# Patient Record
Sex: MALE | Race: WHITE | NOT HISPANIC OR LATINO | Employment: FULL TIME | URBAN - METROPOLITAN AREA
[De-identification: names, ages, dates, MRNs, and addresses within clinical notes are randomized per-mention and may not be internally consistent; named-entity substitution may affect disease eponyms.]

---

## 2021-07-01 ENCOUNTER — PATIENT MESSAGE (OUTPATIENT)
Dept: ADMINISTRATIVE | Facility: OTHER | Age: 26
End: 2021-07-01

## 2022-08-21 NOTE — PROGRESS NOTES
[facility] Note    Chief Complaint      Chief Complaint   Patient presents with    Annual Exam    Establish Care       History of Present Illness      Mannie Naqvi is a 26 y.o. M with chronic back pain, depression, Alopecia, ADD, Inflammatory Arthritis presents to est PCP and for well visit.    Chronic back pain - Since the age of 14-15 he has had significant back pain intermittently. He has seen multiple specialists including previous rheumatologist in [facility].  Sees chiropractor physical therapy anti-inflammatory muscle relaxants to try to help his pain. He reports he did different type of weight set along with stretching type process he has found physical therapy to be beneficial he did wonderful when he was prescribed a Medrol Dosepak with Dr. Andrew on August 29. Robaxin did not find to be beneficial.  He has had an MRI in the distant past he cannot recall the specifics of it he has not had any procedures or surgery involving. He takes naproxen 500mg po bid prn, flexeril 10mg po qhs prn. +HLA B27 - 8/26/14 and 10/11/18    Depression - Prev dx by psychiatrist he took an antidepressant but did not find it to be beneficial and discontinued. He reports he thinks his depression is situational related to his chronic pain that is been undiagnosed.    Alopecia - Pt on finasteride 5 mg/d and Minoxodil 2.5 mg/d    ADD - Adderall XR 20 mg QAm and 10 mg daily.  Fu by Psych.     Chronic back pain- Fu by Rheum.  + Chronic back pain. He takes Flexeril 5 mg po QHS oprn and naprosyn prn.     H/o COVID -19 - 7/18/20 - No lingering effects    HCM - Flu - ?'22;  Tdap - < 10 yrs?;  PCV 13 - none;  PVX 23 - none;  Shingrix - due at 48 y.o.;  COVID - 19 Vaccine #1 3/31/21;  #2 4/28/21; # 3 2/4/22;  Hep C Screen - none - declines;  HIV Screen - neg. In past; C-scope - none - due at 43 y.o.;  Prev PCP - none; Rheum - Dr. Stein at [facility]; Derm - Dr. Montemayor; Psych - Dr. Kirill Clemens; optometry - [facility];  - Dr. Kunz; Cysto - 5/23/14 - neg. 6/1/17  - Stress test - neg; echo - 5/20/17 - EF - 60-65%    Patient Care Team:  Jelly Nettles MD as PCP - General (Internal Medicine)     Health Maintenance:    There is no immunization history on file for this patient.   Health Maintenance   Topic Date Due    Hepatitis C Screening  Never done    Lipid Panel  Never done    TETANUS VACCINE  Never done        Past Medical History:  Past Medical History:   Diagnosis Date    ADD (attention deficit disorder) 08/24/2022    Alopecia 08/24/2022    Chronic mid back pain 08/24/2022    COVID-19 07/18/2020    Depression 08/24/2022       Past Surgical History:   has no past surgical history on file.    Family History:  family history includes Arthritis in his paternal grandfather; Chronic back pain in his father and sister; Depression in his sister; Heart attack in his cousin; Heart attack (age of onset: 84) in his maternal grandmother; Hyperlipidemia in his maternal grandfather and mother; Kidney disease in his maternal grandfather.     Social History:  Social History     Tobacco Use    Smoking status: Never Smoker    Smokeless tobacco: Never Used   Substance Use Topics    Alcohol use: Yes     Comment: 2/night    Drug use: Never       Review of Systems   Constitutional: Negative for chills, diaphoresis and fever.   HENT: Negative for hearing loss.    Eyes: Negative for visual disturbance.        Wears contacts   Respiratory: Positive for chest tightness and shortness of breath.         Only when gets anxiety/panic attack   Cardiovascular: Negative for chest pain and palpitations.   Gastrointestinal: Negative for abdominal pain, constipation, diarrhea, nausea and vomiting.   Genitourinary: Positive for dysuria. Negative for hematuria.        No hesitancy. + nocturia. He reports a neg. Cystoscopy in the past. +Polyuria.  He drinks 2-4 cups of coffee/day. He tries to limit fluids prior to bed.  dysuia once  "every 6-8 wks when dehydrated.    Musculoskeletal: Positive for back pain. Negative for arthralgias.   Neurological: Negative for dizziness and headaches.   Psychiatric/Behavioral: Positive for dysphoric mood. Negative for suicidal ideas. The patient is nervous/anxious.         Medications:    Current Outpatient Medications:     cyclobenzaprine (FLEXERIL) 5 MG tablet, Take 10 mg by mouth once daily at 6am., Disp: , Rfl:     dextroamphetamine-amphetamine 10 mg Tab, Take 1 tablet by mouth once daily at 6am., Disp: , Rfl:     finasteride (PROSCAR) 5 mg tablet, Take 1 tablet by mouth once daily at 6am., Disp: , Rfl:     minoxidiL (LONITEN) 2.5 MG tablet, Take 1 tablet by mouth once daily at 6am., Disp: , Rfl:     naproxen (NAPROSYN) 500 MG tablet, Take 1 tablet (500 mg total) by mouth 2 (two) times daily with meals., Disp: 60 tablet, Rfl: 6    dextroamphetamine-amphetamine (ADDERALL XR) 20 MG 24 hr capsule, Take 1 capsule (20 mg total) by mouth every morning., Disp: 1 capsule, Rfl: 0     Allergies:  Review of patient's allergies indicates:  No Known Allergies    Physical Exam      Vital Signs  Temp: 98 °F (36.7 °C)  Temp src: Oral  Pulse: 77  Resp: 16  SpO2: 98 %  BP: 136/84  BP Location: Right arm  Patient Position: Sitting  Pain Score: 0-No pain  Height and Weight  Height: 5' 6" (167.6 cm)  Weight: 77.1 kg (169 lb 15.6 oz)  BSA (Calculated - sq m): 1.89 sq meters  BMI (Calculated): 27.4  Weight in (lb) to have BMI = 25: 154.6      Patient Position: Sitting      Physical Exam  Vitals reviewed.   Constitutional:       General: He is not in acute distress.     Appearance: Normal appearance. He is not ill-appearing, toxic-appearing or diaphoretic.   HENT:      Head: Normocephalic and atraumatic.      Right Ear: Tympanic membrane normal.      Left Ear: Tympanic membrane normal.   Eyes:      Extraocular Movements: Extraocular movements intact.      Conjunctiva/sclera: Conjunctivae normal.      Pupils: Pupils are " equal, round, and reactive to light.   Cardiovascular:      Rate and Rhythm: Normal rate and regular rhythm.      Pulses: Normal pulses.      Heart sounds: Normal heart sounds. No murmur heard.  Pulmonary:      Effort: Pulmonary effort is normal. No respiratory distress.      Breath sounds: Normal breath sounds.   Abdominal:      General: Bowel sounds are normal. There is no distension.      Palpations: Abdomen is soft.      Tenderness: There is no abdominal tenderness. There is no guarding.   Musculoskeletal:         General: Normal range of motion.      Cervical back: Neck supple.   Skin:     General: Skin is warm.   Neurological:      General: No focal deficit present.      Mental Status: He is alert and oriented to person, place, and time.   Psychiatric:         Mood and Affect: Mood normal.         Behavior: Behavior normal.          Laboratory:      Assessment/Plan     Mannie Naqvi is a 26 y.o.male with:    Normal physical exam, routine  -     CBC Auto Differential; Future; Expected date: 08/24/2022  -     Comprehensive Metabolic Panel; Future; Expected date: 08/24/2022  -     T4, Free; Future; Expected date: 08/24/2022  -     TSH; Future; Expected date: 08/24/2022  -     Urinalysis, Reflex to Urine Culture Urine, Clean Catch  - Performed today.  Will check Basic labs.  RTC in 1 yr for fu or sooner if needed    Chronic mid back pain  - Stable.  Cont current regimen. Fu by rheum.     Alopecia  - Fu by Dr. Montemayor.     Depression, unspecified depression type  - Stable.  Cont current regimen. Managed by Psych.     Attention deficit disorder, unspecified hyperactivity presence  - Stable.  Cont current regimen. Managed by Psych.     Screening for lipoid disorders  -     Lipid Panel; Future; Expected date: 08/24/2022    Other orders  -     dextroamphetamine-amphetamine (ADDERALL XR) 20 MG 24 hr capsule; Take 1 capsule (20 mg total) by mouth every morning.  Dispense: 1 capsule; Refill: 0         Chronic conditions  status updated as per HPI.  Other than changes above, cont current medications and maintain follow up with specialists.  Follow up in about 1 year (around 8/24/2023) for well visit or sooner if needed.      Jelly Nettles MD  [facility]

## 2022-08-24 ENCOUNTER — OFFICE VISIT (OUTPATIENT)
Dept: PRIMARY CARE CLINIC | Facility: CLINIC | Age: 27
End: 2022-08-24
Payer: COMMERCIAL

## 2022-08-24 VITALS
OXYGEN SATURATION: 98 % | SYSTOLIC BLOOD PRESSURE: 136 MMHG | HEART RATE: 77 BPM | WEIGHT: 170 LBS | RESPIRATION RATE: 16 BRPM | HEIGHT: 66 IN | BODY MASS INDEX: 27.32 KG/M2 | TEMPERATURE: 98 F | DIASTOLIC BLOOD PRESSURE: 84 MMHG

## 2022-08-24 DIAGNOSIS — F32.A DEPRESSION, UNSPECIFIED DEPRESSION TYPE: ICD-10-CM

## 2022-08-24 DIAGNOSIS — F98.8 ATTENTION DEFICIT DISORDER, UNSPECIFIED HYPERACTIVITY PRESENCE: ICD-10-CM

## 2022-08-24 DIAGNOSIS — Z00.00 NORMAL PHYSICAL EXAM, ROUTINE: Primary | ICD-10-CM

## 2022-08-24 DIAGNOSIS — Z13.220 SCREENING FOR LIPOID DISORDERS: ICD-10-CM

## 2022-08-24 DIAGNOSIS — G89.29 CHRONIC MID BACK PAIN: ICD-10-CM

## 2022-08-24 DIAGNOSIS — M54.9 CHRONIC MID BACK PAIN: ICD-10-CM

## 2022-08-24 DIAGNOSIS — L65.9 ALOPECIA: ICD-10-CM

## 2022-08-24 PROCEDURE — 3075F SYST BP GE 130 - 139MM HG: CPT | Mod: CPTII,S$GLB,, | Performed by: INTERNAL MEDICINE

## 2022-08-24 PROCEDURE — 1159F MED LIST DOCD IN RCRD: CPT | Mod: CPTII,S$GLB,, | Performed by: INTERNAL MEDICINE

## 2022-08-24 PROCEDURE — 99999 PR PBB SHADOW E&M-EST. PATIENT-LVL V: ICD-10-PCS | Mod: PBBFAC,,, | Performed by: INTERNAL MEDICINE

## 2022-08-24 PROCEDURE — 1160F PR REVIEW ALL MEDS BY PRESCRIBER/CLIN PHARMACIST DOCUMENTED: ICD-10-PCS | Mod: CPTII,S$GLB,, | Performed by: INTERNAL MEDICINE

## 2022-08-24 PROCEDURE — 99385 PREV VISIT NEW AGE 18-39: CPT | Mod: S$GLB,,, | Performed by: INTERNAL MEDICINE

## 2022-08-24 PROCEDURE — 3079F DIAST BP 80-89 MM HG: CPT | Mod: CPTII,S$GLB,, | Performed by: INTERNAL MEDICINE

## 2022-08-24 PROCEDURE — 3008F BODY MASS INDEX DOCD: CPT | Mod: CPTII,S$GLB,, | Performed by: INTERNAL MEDICINE

## 2022-08-24 PROCEDURE — 99385 PR PREVENTIVE VISIT,NEW,18-39: ICD-10-PCS | Mod: S$GLB,,, | Performed by: INTERNAL MEDICINE

## 2022-08-24 PROCEDURE — 3079F PR MOST RECENT DIASTOLIC BLOOD PRESSURE 80-89 MM HG: ICD-10-PCS | Mod: CPTII,S$GLB,, | Performed by: INTERNAL MEDICINE

## 2022-08-24 PROCEDURE — 3075F PR MOST RECENT SYSTOLIC BLOOD PRESS GE 130-139MM HG: ICD-10-PCS | Mod: CPTII,S$GLB,, | Performed by: INTERNAL MEDICINE

## 2022-08-24 PROCEDURE — 1159F PR MEDICATION LIST DOCUMENTED IN MEDICAL RECORD: ICD-10-PCS | Mod: CPTII,S$GLB,, | Performed by: INTERNAL MEDICINE

## 2022-08-24 PROCEDURE — 1160F RVW MEDS BY RX/DR IN RCRD: CPT | Mod: CPTII,S$GLB,, | Performed by: INTERNAL MEDICINE

## 2022-08-24 PROCEDURE — 99999 PR PBB SHADOW E&M-EST. PATIENT-LVL V: CPT | Mod: PBBFAC,,, | Performed by: INTERNAL MEDICINE

## 2022-08-24 PROCEDURE — 3008F PR BODY MASS INDEX (BMI) DOCUMENTED: ICD-10-PCS | Mod: CPTII,S$GLB,, | Performed by: INTERNAL MEDICINE

## 2022-08-24 RX ORDER — DEXTROAMPHETAMINE SACCHARATE, AMPHETAMINE ASPARTATE, DEXTROAMPHETAMINE SULFATE AND AMPHETAMINE SULFATE 2.5; 2.5; 2.5; 2.5 MG/1; MG/1; MG/1; MG/1
1 TABLET ORAL DAILY
COMMUNITY
Start: 2022-08-01

## 2022-08-24 RX ORDER — FINASTERIDE 5 MG/1
1 TABLET, FILM COATED ORAL DAILY
COMMUNITY

## 2022-08-24 RX ORDER — DEXTROAMPHETAMINE SACCHARATE, AMPHETAMINE ASPARTATE MONOHYDRATE, DEXTROAMPHETAMINE SULFATE AND AMPHETAMINE SULFATE 5; 5; 5; 5 MG/1; MG/1; MG/1; MG/1
20 CAPSULE, EXTENDED RELEASE ORAL EVERY MORNING
Qty: 1 CAPSULE | Refills: 0
Start: 2022-08-24

## 2022-08-24 RX ORDER — CYCLOBENZAPRINE HCL 5 MG
10 TABLET ORAL DAILY
COMMUNITY
Start: 2022-08-01 | End: 2022-09-21

## 2022-08-24 RX ORDER — DEXTROAMPHETAMINE SACCHARATE, AMPHETAMINE ASPARTATE, DEXTROAMPHETAMINE SULFATE AND AMPHETAMINE SULFATE 5; 5; 5; 5 MG/1; MG/1; MG/1; MG/1
1 TABLET ORAL DAILY
COMMUNITY
Start: 2022-08-23 | End: 2022-08-24

## 2022-08-24 RX ORDER — MINOXIDIL 2.5 MG/1
1 TABLET ORAL DAILY
COMMUNITY
Start: 2022-06-22

## 2023-01-23 ENCOUNTER — TELEPHONE (OUTPATIENT)
Dept: PRIMARY CARE CLINIC | Facility: CLINIC | Age: 28
End: 2023-01-23
Payer: COMMERCIAL

## 2023-01-23 NOTE — TELEPHONE ENCOUNTER
----- Message from Leandro Yin sent at 1/23/2023  1:11 PM CST -----  Contact: self [phone]  Pt requesting annual lab orders.      Please call and advise

## 2023-01-23 NOTE — TELEPHONE ENCOUNTER
----- Message from Penny Chavez sent at 1/23/2023  3:48 PM CST -----  Contact: CARL SEN [[id number]]@ [phone]  Mariela brewer is still waiting a lab orders from Dr Nettles for today.

## 2023-01-23 NOTE — TELEPHONE ENCOUNTER
Lvm requesting a call back. See  ordered annual labs in Aug 2022 to be done at [facility]. I do not see these resulted. There orders should still be good and I can email or mail a hard copy to pt if needed. Could also switch to internal if need be

## 2023-01-25 LAB
ALBUMIN: 4.7 G/DL (ref 3.5–5)
ALP SERPL-CCNC: 56 U/L (ref 38–126)
ALT SERPL W P-5'-P-CCNC: 16 U/L (ref 7–56)
ANION GAP SERPL CALC-SCNC: 13.5 MMOL/L (ref 9–18)
APPEARANCE UR: CLEAR
AST SERPL-CCNC: 16 U/L (ref 7–40)
BACTERIA #/AREA URNS HPF: NORMAL /HPF
BACTERIA UR CULT: NORMAL
BASOPHILS ABSOLUTE COUNT: 0 THOUSAND/UL (ref 0–0.2)
BASOPHILS NFR BLD: 0.6 % (ref 0–2)
BILIRUB SERPL-MCNC: 0.6 MG/DL (ref 0–1.2)
BILIRUB UR QL STRIP: NEGATIVE
BUN BLD-MCNC: 18 MG/DL (ref 7–21)
BUN/CREAT SERPL: 16 (ref 6–22)
CALC OSMOLALITY: 270 MOSM/KG (ref 275–295)
CALCIUM SERPL-MCNC: 9.7 MG/DL (ref 8.5–10.5)
CHLORIDE SERPL-SCNC: 99 MMOL/L (ref 98–107)
CHOL/HDLC RATIO: 3.51
CHOLEST SERPL-MSCNC: 179 MG/DL (ref 100–160)
CO2 SERPL-SCNC: 26 MMOL/L (ref 21–31)
COLOR UR: YELLOW
CREAT SERPL-MCNC: 1.12 MG/DL (ref 0.7–1.2)
EGFR: 91 ML/MIN
EOSINOPHIL NFR BLD: 1.3 % (ref 0–4)
EOSINOPHILS ABSOLUTE COUNT: 0.1 THOUSAND/UL (ref 0–0.7)
ERYTHROCYTE [DISTWIDTH] IN BLOOD BY AUTOMATED COUNT: 13.1 % (ref 12–15.3)
FREE T4: 1.36 NG/DL (ref 0.9–1.7)
GLUCOSE SERPL-MCNC: 103 MG/DL (ref 70–100)
GLUCOSE UR QL STRIP: NEGATIVE
HCT VFR BLD AUTO: 41.5 % (ref 40–52)
HDLC SERPL-MCNC: 51 MG/DL (ref 40–75)
HGB BLD-MCNC: 14.4 GM/DL (ref 13.6–17.5)
HGB UR QL STRIP: NEGATIVE
HYALINE CASTS #/AREA URNS LPF: NORMAL /LPF
KETONES UR QL STRIP: NEGATIVE
LDLC SERPL CALC-MCNC: 114 MG/DL (ref 0–95)
LEUKOCYTE ESTERASE UR QL STRIP: NEGATIVE
LYMPHOCYTES %: 18.9 % (ref 15–45)
LYMPHOCYTES ABSOLUTE COUNT: 1.2 THOUSAND/UL (ref 1–4.2)
MCH RBC QN AUTO: 29.5 PG (ref 27–33)
MCHC RBC AUTO-ENTMCNC: 34.8 G/DL (ref 32–36)
MCV RBC AUTO: 85 FL (ref 80–94)
MONOCYTES %: 7.7 % (ref 3–13)
MONOCYTES ABSOLUTE COUNT: 0.5 THOUSAND/UL (ref 0.1–0.8)
NEUTROPHILS ABSOLUTE COUNT: 4.7 THOUSAND/UL (ref 2.1–7.6)
NEUTROPHILS RELATIVE PERCENT: 71.5 % (ref 32–80)
NITRITE UR QL STRIP: NEGATIVE
PH UR STRIP: 7 [PH] (ref 5–8)
PLATELET # BLD AUTO: 349 THOUSAND/UL (ref 150–350)
PMV BLD AUTO: 7.2 FL (ref 7–10.2)
POTASSIUM SERPL-SCNC: 4.5 MMOL/L (ref 3.5–5)
PROT UR QL STRIP: NEGATIVE
RBC # BLD AUTO: 4.88 MILLION/UL (ref 4.45–5.9)
RBC #/AREA URNS HPF: NORMAL /HPF
SERVICE CMNT-IMP: NORMAL
SODIUM BLD-SCNC: 134 MMOL/L (ref 135–145)
SP GR UR STRIP: 1.02 (ref 1–1.03)
SQUAMOUS #/AREA URNS HPF: NORMAL /HPF
TOTAL PROTEIN: 7.1 G/DL (ref 6.3–8.2)
TRIGL SERPL-MCNC: 89 MG/DL (ref 30–150)
TSH SERPL DL<=0.005 MIU/L-ACNC: 1.5 UIU/ML (ref 0.35–4)
WBC # BLD AUTO: 6.5 THOUSAND/UL (ref 4.5–11)
WBC #/AREA URNS HPF: NORMAL /HPF

## 2023-02-03 ENCOUNTER — TELEPHONE (OUTPATIENT)
Dept: PRIMARY CARE CLINIC | Facility: CLINIC | Age: 28
End: 2023-02-03
Payer: COMMERCIAL

## 2023-02-03 DIAGNOSIS — R73.01 IFG (IMPAIRED FASTING GLUCOSE): Primary | ICD-10-CM

## 2023-02-03 DIAGNOSIS — E87.1 HYPONATREMIA: ICD-10-CM

## 2023-02-03 NOTE — TELEPHONE ENCOUNTER
----- Message from Jelly Nettles MD sent at 2/3/2023  5:52 AM CST -----  I sent pt a my chart message -  I reviewed your  labs.  Your thyroid functions are normal.  Your Cholesterol looked good.  Your kidney function and liver functions looked good.  Your glucose was slightly high at 103.  I would like to check a Ha1c to screen you for  prediabetes. Your sodium was just slightly low at 134.  We will repeat this with your next labs in 4 wks. You are not anemic. Your urine analysis was normal. No further recommendations at this time.    Dr. HULL

## 2023-02-03 NOTE — PROGRESS NOTES
I sent pt a my chart message -  I reviewed your  labs.  Your thyroid functions are normal.  Your Cholesterol looked good.  Your kidney function and liver functions looked good.  Your glucose was slightly high at 103.  I would like to check a Ha1c to screen you for  prediabetes. Your sodium was just slightly low at 134.  We will repeat this with your next labs in 4 wks. You are not anemic. Your urine analysis was normal. No further recommendations at this time.    Dr. HULL

## 2023-02-03 NOTE — TELEPHONE ENCOUNTER
Patient was informed and expressed understanding. Pt would like repeat labs at [facility]. Will mail orders to his home. Address was verified.

## 2023-08-27 NOTE — PROGRESS NOTES
"[facility] Note    Chief Complaint      Chief Complaint   Patient presents with    Annual Exam       History of Present Illness      Mannie Naqvi is a 27 y.o. M with chronic back pain, depression, Alopecia, ADD, Inflammatory Arthritis presents to fu for well visit.  Last visit - 8/24/22    IFG - Glucose was slightly high at 103.  I would like to check a Ha1c to screen you for prediabetes. Your sodium was just slightly low at 134.  Will repeat this with your next labs in 4 wks.       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       Chronic back pain - Since the age of 14-15 he has had significant back pain intermittently. He has seen multiple specialists including previous rheumatologist in [facility].  Sees chiropractor physical therapy anti-inflammatory muscle relaxants to try to help his pain. He found physical therapy to be beneficial. He did wonderful when he was prescribed a Medrol Dosepak with Dr. Andrew in the past. Robaxin was not beneficial.  He has had an MRI in the distant past he cannot recall the specifics of it he has not had any procedures or surgery. He takes naproxen 500mg po bid prn, flexeril 5 mg po qhs prn. +HLA B27 - 8/26/14 and 10/11/18.  Biking, stretching, and ice help. It radiates down to his Lateral Rt knee. He has pulled his back 4 times this yr. Most recently last weekend. Will repeat Xrays SI joints and L spine.  Refer to Back and spine. ? Concern for Ankylosing Spondylitis      Depression - Prev dx by psychiatrist he took an antidepressant but did not find it to be beneficial and discontinued.  "It's been pretty good".      Alopecia - Pt on finasteride 5 mg/d and Minoxodil 2.5 mg/d     ADD - Adderall XR 20 mg QAm and 10 mg daily.  Fu by Psych.      HCM - Flu - none;  Tdap - admin 8/30/23;  PCV 13 - none;  PVX 23 - none;  Shingrix - due at 48 y.o.;  COVID - 19 Vaccine #1 3/31/21;  #2 4/28/21; # 3 2/4/22;  Hep C Screen - none - declines;  HIV Screen - neg. In " past; C-scope - none - due at 43 y.o.;  Prev PCP - none; Rheum - Dr. Stein at [facility]; Derm - Dr. Montemayor; Ophtho - Dr. Daley; Psych - Dr. Kirill Clemens; optometry - [facility];  - Dr. Kunz; Cysto - 5/23/14 - neg. 6/1/17  - Stress test - neg; echo - 5/20/17 - EF - 60-65%     Patient Care Team:  Jelly Nettles MD as PCP - General (Internal Medicine)     Health Maintenance:    There is no immunization history on file for this patient.   Health Maintenance   Topic Date Due    Hepatitis C Screening  Never done    TETANUS VACCINE  Never done    Lipid Panel  Completed        Past Medical History:  Past Medical History:   Diagnosis Date    ADD (attention deficit disorder) 08/24/2022    Alopecia 08/24/2022    Chronic mid back pain 08/24/2022    COVID-19 07/18/2020    Depression 08/24/2022       Past Surgical History:   has no past surgical history on file.    Family History:  family history includes Arthritis in his paternal grandfather; Chronic back pain in his father and sister; Depression in his sister; Heart attack in his cousin; Heart attack (age of onset: 84) in his maternal grandmother; Hyperlipidemia in his maternal grandfather and mother; Kidney disease in his maternal grandfather.     Social History:  Social History     Tobacco Use    Smoking status: Never    Smokeless tobacco: Never   Substance Use Topics    Alcohol use: Yes     Comment: 2/night    Drug use: Never       Review of Systems   Constitutional:  Negative for chills, diaphoresis and fever.   HENT:  Negative for hearing loss.    Eyes:  Negative for visual disturbance.        Wears glasses   Respiratory:  Negative for chest tightness and shortness of breath.    Cardiovascular:  Negative for chest pain and palpitations.   Gastrointestinal:  Negative for abdominal pain, constipation, diarrhea, nausea, vomiting and reflux.   Endocrine: Negative for cold intolerance, heat intolerance and polydipsia.   Genitourinary:  Negative for dysuria,  "frequency and hematuria.   Musculoskeletal:  Positive for back pain.   Neurological:  Negative for dizziness and headaches.   Psychiatric/Behavioral:  Negative for dysphoric mood.         Medications:    Current Outpatient Medications:     cyclobenzaprine (FLEXERIL) 5 MG tablet, Take 10 mg by mouth nightly as needed., Disp: , Rfl:     dextroamphetamine-amphetamine (ADDERALL XR) 20 MG 24 hr capsule, Take 1 capsule (20 mg total) by mouth every morning., Disp: 1 capsule, Rfl: 0    dextroamphetamine-amphetamine 10 mg Tab, Take 1 tablet by mouth once daily at 6am., Disp: , Rfl:     finasteride (PROSCAR) 5 mg tablet, Take 1 tablet by mouth once daily at 6am., Disp: , Rfl:     minoxidiL (LONITEN) 2.5 MG tablet, Take 1 tablet by mouth once daily at 6am., Disp: , Rfl:     naproxen (NAPROSYN) 500 MG tablet, Take 1 tablet (500 mg total) by mouth 2 (two) times daily with meals., Disp: 60 tablet, Rfl: 6    diphth,pertus,acell,,tetanus (BOOSTRIX TDAP) 2.5-8-5 Lf-mcg-Lf/0.5mL Syrg injection, Inject into the muscle., Disp: 0.5 mL, Rfl: 0     Allergies:  Review of patient's allergies indicates:  No Known Allergies    Physical Exam      Vital Signs  Temp: 98 °F (36.7 °C)  Temp Source: Oral  Pulse: 72  Resp: 16  SpO2: 99 %  BP: 110/78  BP Location: Left arm  Patient Position: Sitting  Pain Score:   7  Pain Loc: Back  Height and Weight  Height: 5' 6" (167.6 cm)  Weight: 73.5 kg (162 lb 0.6 oz)  BSA (Calculated - sq m): 1.85 sq meters  BMI (Calculated): 26.2  Weight in (lb) to have BMI = 25: 154.6      Patient Position: Sitting      Physical Exam  Vitals reviewed.   Constitutional:       General: He is not in acute distress.     Appearance: Normal appearance. He is not ill-appearing, toxic-appearing or diaphoretic.   HENT:      Head: Normocephalic and atraumatic.      Right Ear: Tympanic membrane normal.      Left Ear: Tympanic membrane normal.   Eyes:      Extraocular Movements: Extraocular movements intact.      Conjunctiva/sclera: " Conjunctivae normal.      Pupils: Pupils are equal, round, and reactive to light.   Neck:      Vascular: No carotid bruit.   Cardiovascular:      Rate and Rhythm: Normal rate and regular rhythm.      Pulses: Normal pulses.      Heart sounds: Normal heart sounds. No murmur heard.  Pulmonary:      Effort: Pulmonary effort is normal.      Breath sounds: Normal breath sounds.   Abdominal:      General: Bowel sounds are normal. There is no distension.      Palpations: Abdomen is soft.      Tenderness: There is no abdominal tenderness. There is no guarding or rebound.   Musculoskeletal:      Comments: NTTP over Octaviano PSIS joints however over the RT PSIS joint is where pt points to location of pain. +Rt straight leg raise.    Neurological:      Mental Status: He is alert.   Psychiatric:         Mood and Affect: Mood normal.         Behavior: Behavior normal.          Laboratory:  CBC:  Recent Labs   Lab 01/24/23  0842   WBC 6.5   RBC 4.88   Hemoglobin 14.4   Hematocrit 41.5   Platelets 349   MCV 85.0   MCH 29.5   MCHC 34.8       CMP:  Recent Labs   Lab 01/24/23  0842   Glucose 103 H   Calcium 9.7   ALBUMIN 4.7   Total Protein 7.1   Sodium 134 L   Potassium 4.5   CO2 26   Chloride 99   BUN 18.0   Creatinine 1.12   Alkaline Phosphatase 56   ALT 16   AST 16   Total Bilirubin 0.6         URINALYSIS:  Recent Labs   Lab 01/24/23  0842   Color, UA YELLOW   Specific Gravity, UA 1.019   pH, UA 7.0   Protein, UA NEGATIVE   Glucose, UA NEGATIVE   Bacteria, UA NONE SEEN   Nitrite, UA NEGATIVE   Leukocytes, UA NEGATIVE   Hyaline Casts, UA NONE SEEN        LIPIDS:  Recent Labs   Lab 01/24/23  0842   TSH 1.50   HDL 51   Cholesterol 179 H   Triglycerides 89   LDL Calculated 114 H       TSH:  Recent Labs   Lab 01/24/23  0842   TSH 1.50       Assessment/Plan     Mannie Naqvi is a 27 y.o.male with:    Normal physical exam, routine  - Performed today.  Will check Basic labs.  RTC in 1 yr for fu or sooner if needed    Hyponatremia  -     Basic  Metabolic Panel; Future; Expected date: 08/30/2023  - Repeat BMP.     IFG (impaired fasting glucose)  -     HEMOGLOBIN A1C; Future; Expected date: 08/30/2023  - Check Ha1c.     Alopecia  - Stable.  Cont current regimen per Dr. Montemayor. .    Chronic bilateral low back pain, unspecified whether sciatica present  -     Ambulatory referral/consult to [facility] Spine Clinic; Future; Expected date: 09/06/2023  -     X-Ray Lumbar Spine 5 View; Future; Expected date: 08/30/2023  -     X-Ray Sacroiliac Joints 3 Views; Future; Expected date: 08/30/2023  - Will repeat Xrays SI joints and L spine.  Refer to Back and spine.  Cont stretches, Naprosyn, and flexeril prn.    HLA B27 (HLA B27 positive)  -     Ambulatory referral/consult to Rheumatology; Future; Expected date: 09/06/2023  -     X-Ray Lumbar Spine 5 View; Future; Expected date: 08/30/2023  -     X-Ray Sacroiliac Joints 3 Views; Future; Expected date: 08/30/2023  - Refer to Rheum for a 2nd opinion.        Chronic conditions status updated as per HPI.  Other than changes above, cont current medications and maintain follow up with specialists.  Follow up in about 1 year (around 8/30/2024) for well visit or sooner if needed.      Jelly Nettles MD  [facility]

## 2023-08-30 ENCOUNTER — OFFICE VISIT (OUTPATIENT)
Dept: PRIMARY CARE CLINIC | Facility: CLINIC | Age: 28
End: 2023-08-30
Payer: COMMERCIAL

## 2023-08-30 ENCOUNTER — HOSPITAL ENCOUNTER (OUTPATIENT)
Dept: RADIOLOGY | Facility: HOSPITAL | Age: 28
Discharge: HOME | End: 2023-08-30
Attending: INTERNAL MEDICINE
Payer: COMMERCIAL

## 2023-08-30 VITALS
RESPIRATION RATE: 16 BRPM | HEIGHT: 66 IN | SYSTOLIC BLOOD PRESSURE: 110 MMHG | WEIGHT: 162.06 LBS | TEMPERATURE: 98 F | OXYGEN SATURATION: 99 % | DIASTOLIC BLOOD PRESSURE: 78 MMHG | BODY MASS INDEX: 26.05 KG/M2 | HEART RATE: 72 BPM

## 2023-08-30 DIAGNOSIS — Z15.89 HLA B27 (HLA B27 POSITIVE): ICD-10-CM

## 2023-08-30 DIAGNOSIS — M54.50 CHRONIC BILATERAL LOW BACK PAIN, UNSPECIFIED WHETHER SCIATICA PRESENT: ICD-10-CM

## 2023-08-30 DIAGNOSIS — E87.1 HYPONATREMIA: ICD-10-CM

## 2023-08-30 DIAGNOSIS — L65.9 ALOPECIA: ICD-10-CM

## 2023-08-30 DIAGNOSIS — G89.29 CHRONIC BILATERAL LOW BACK PAIN, UNSPECIFIED WHETHER SCIATICA PRESENT: ICD-10-CM

## 2023-08-30 DIAGNOSIS — Z00.00 NORMAL PHYSICAL EXAM, ROUTINE: Primary | ICD-10-CM

## 2023-08-30 DIAGNOSIS — R73.01 IFG (IMPAIRED FASTING GLUCOSE): ICD-10-CM

## 2023-08-30 PROCEDURE — 72200 X-RAY EXAM SI JOINTS: CPT | Mod: 26,,, | Performed by: RADIOLOGY

## 2023-08-30 PROCEDURE — 3078F PR MOST RECENT DIASTOLIC BLOOD PRESSURE < 80 MM HG: ICD-10-PCS | Mod: CPTII,S$GLB,, | Performed by: INTERNAL MEDICINE

## 2023-08-30 PROCEDURE — 99395 PREV VISIT EST AGE 18-39: CPT | Mod: S$GLB,,, | Performed by: INTERNAL MEDICINE

## 2023-08-30 PROCEDURE — 3008F PR BODY MASS INDEX (BMI) DOCUMENTED: ICD-10-PCS | Mod: CPTII,S$GLB,, | Performed by: INTERNAL MEDICINE

## 2023-08-30 PROCEDURE — 1159F MED LIST DOCD IN RCRD: CPT | Mod: CPTII,S$GLB,, | Performed by: INTERNAL MEDICINE

## 2023-08-30 PROCEDURE — 72200 XR SACROILIAC JOINTS 3 VIEWS: ICD-10-PCS | Mod: 26,,, | Performed by: RADIOLOGY

## 2023-08-30 PROCEDURE — 3074F PR MOST RECENT SYSTOLIC BLOOD PRESSURE < 130 MM HG: ICD-10-PCS | Mod: CPTII,S$GLB,, | Performed by: INTERNAL MEDICINE

## 2023-08-30 PROCEDURE — 3078F DIAST BP <80 MM HG: CPT | Mod: CPTII,S$GLB,, | Performed by: INTERNAL MEDICINE

## 2023-08-30 PROCEDURE — 3074F SYST BP LT 130 MM HG: CPT | Mod: CPTII,S$GLB,, | Performed by: INTERNAL MEDICINE

## 2023-08-30 PROCEDURE — 1159F PR MEDICATION LIST DOCUMENTED IN MEDICAL RECORD: ICD-10-PCS | Mod: CPTII,S$GLB,, | Performed by: INTERNAL MEDICINE

## 2023-08-30 PROCEDURE — 99999 PR PBB SHADOW E&M-EST. PATIENT-LVL V: ICD-10-PCS | Mod: PBBFAC,,, | Performed by: INTERNAL MEDICINE

## 2023-08-30 PROCEDURE — 1160F PR REVIEW ALL MEDS BY PRESCRIBER/CLIN PHARMACIST DOCUMENTED: ICD-10-PCS | Mod: CPTII,S$GLB,, | Performed by: INTERNAL MEDICINE

## 2023-08-30 PROCEDURE — 3008F BODY MASS INDEX DOCD: CPT | Mod: CPTII,S$GLB,, | Performed by: INTERNAL MEDICINE

## 2023-08-30 PROCEDURE — 72110 X-RAY EXAM L-2 SPINE 4/>VWS: CPT | Mod: 26,,, | Performed by: RADIOLOGY

## 2023-08-30 PROCEDURE — 1160F RVW MEDS BY RX/DR IN RCRD: CPT | Mod: CPTII,S$GLB,, | Performed by: INTERNAL MEDICINE

## 2023-08-30 PROCEDURE — 72110 XR LUMBAR SPINE COMPLETE 5 VIEW: ICD-10-PCS | Mod: 26,,, | Performed by: RADIOLOGY

## 2023-08-30 PROCEDURE — 72110 X-RAY EXAM L-2 SPINE 4/>VWS: CPT | Mod: TC,PN

## 2023-08-30 PROCEDURE — 72200 X-RAY EXAM SI JOINTS: CPT | Mod: TC,PN

## 2023-08-30 PROCEDURE — 99395 PR PREVENTIVE VISIT,EST,18-39: ICD-10-PCS | Mod: S$GLB,,, | Performed by: INTERNAL MEDICINE

## 2023-08-30 PROCEDURE — 99999 PR PBB SHADOW E&M-EST. PATIENT-LVL V: CPT | Mod: PBBFAC,,, | Performed by: INTERNAL MEDICINE

## 2023-08-30 RX ORDER — CYCLOBENZAPRINE HCL 5 MG
10 TABLET ORAL NIGHTLY PRN
COMMUNITY
Start: 2023-04-21 | End: 2023-09-11 | Stop reason: SDUPTHER

## 2023-08-31 ENCOUNTER — TELEPHONE (OUTPATIENT)
Dept: PRIMARY CARE CLINIC | Facility: CLINIC | Age: 28
End: 2023-08-31
Payer: COMMERCIAL

## 2023-08-31 DIAGNOSIS — E87.5 HYPERKALEMIA: Primary | ICD-10-CM

## 2023-08-31 NOTE — TELEPHONE ENCOUNTER
----- Message from Carolina Troy sent at 8/31/2023  4:15 PM CDT -----  Contact: [phone]  Pt is returning a call he missed from the office. Please call.                Thank you

## 2023-08-31 NOTE — TELEPHONE ENCOUNTER
Spoke to patient about lab results per Dr. Nettles. The patient gave verbal understanding. The patient would like to have potassium 2 week follow-up labs at [facility].

## 2023-08-31 NOTE — PROGRESS NOTES
DATE: 8/31/2023  PATIENT: Mannie Naqvi    Supervising Physician: Smith Ramirez M.D.    CHIEF COMPLAINT: mid back pain, low back pain, bilateral leg pain    HISTORY:  Mannie Naqvi is a 27 y.o. male here for initial evaluation of mid and low back and bilateral (R>L) leg pain (Back - 7, Leg - 7).  The pain in the back of the right leg is what bothers him most. Pt says he started having mid back pain around age 13. He has seen multiple doctors in multiple specialities over the years for similar complaints. Pt says in 2014 and 2018 he tested positive for HLA 27 and was told he had ankylosing spondylitis. However, another doctor told him he did not have AS. Pt describes his mid back pain as throbbing. However, he finds it responds well to continued exercise and activity and at this point he finds it tolerable. However, he has been having low back pain and bilateral (R>L) shooting leg pain for a few years. The patient describes the pain as sharp and shooting down the back of his entire leg.  The pain is worse with lifting and improved by nothing. There is positive associated numbness and tingling. There is negative subjective weakness. Prior treatments have included chiropractic rx and multiple rounds of PT with continued home exercises for 8 weeks in the last 6 months with worsening of pain, but no ESIs or surgery. It is the [facility] spine conditioning program. Exercises include head rolls, kneeling back extension, sitting rotation stretch, modified seated side straddle, knee to chest, bird dog, plank, modified seated plank, hip bridges, abdominal bracing, and abdominal crunch. Pt completes each exercise daily for 1 hour with worsening of pain.    Of note, pt will be seeing rheumatology next week due to +HLA27. He says his main concern is his hx of iritis.      The patient denies myelopathic symptoms such as handwriting changes or difficulty with buttons/coins/keys. Denies perineal paresthesias, bowel/bladder  dysfunction.    PAST MEDICAL/SURGICAL HISTORY:  Past Medical History:   Diagnosis Date    ADD (attention deficit disorder) 08/24/2022    Alopecia 08/24/2022    Chronic mid back pain 08/24/2022    COVID-19 07/18/2020    Depression 08/24/2022     No past surgical history on file.    Medications:   Current Outpatient Medications on File Prior to Visit   Medication Sig Dispense Refill    cyclobenzaprine (FLEXERIL) 5 MG tablet Take 10 mg by mouth nightly as needed.      dextroamphetamine-amphetamine (ADDERALL XR) 20 MG 24 hr capsule Take 1 capsule (20 mg total) by mouth every morning. 1 capsule 0    dextroamphetamine-amphetamine 10 mg Tab Take 1 tablet by mouth once daily at 6am.      diphth,pertus,acell,,tetanus (BOOSTRIX TDAP) 2.5-8-5 Lf-mcg-Lf/0.5mL Syrg injection Inject into the muscle. 0.5 mL 0    finasteride (PROSCAR) 5 mg tablet Take 1 tablet by mouth once daily at 6am.      minoxidiL (LONITEN) 2.5 MG tablet Take 1 tablet by mouth once daily at 6am.      naproxen (NAPROSYN) 500 MG tablet Take 1 tablet (500 mg total) by mouth 2 (two) times daily with meals. 60 tablet 6     No current facility-administered medications on file prior to visit.       Social History:   Social History     Socioeconomic History    Marital status: Single   Tobacco Use    Smoking status: Never    Smokeless tobacco: Never   Substance and Sexual Activity    Alcohol use: Yes     Comment: 2/night    Drug use: Never    Sexual activity: Yes     Partners: Female       REVIEW OF SYSTEMS:  Constitution: Negative. Negative for chills, fever and night sweats.   Cardiovascular: Negative for chest pain and syncope.   Respiratory: Negative for cough and shortness of breath.   Gastrointestinal: See HPI. Negative for nausea/vomiting. Negative for abdominal pain.  Genitourinary: See HPI. Negative for discoloration or dysuria.  Skin: Negative for dry skin, itching and rash.   Hematologic/Lymphatic: Negative for bleeding problem. Does not bruise/bleed easily.    Musculoskeletal: Negative for falls and muscle weakness.   Neurological: See HPI. No seizures.   Endocrine: Negative for polydipsia, polyphagia and polyuria.   Allergic/Immunologic: Negative for hives and persistent infections.     EXAM:  There were no vitals taken for this visit.    General: The patient is a very pleasant 27 y.o. male in no apparent distress, the patient is oriented to person, place and time.  Psych: Normal mood and affect  HEENT: Vision grossly intact, hearing intact to the spoken word.  Lungs: Respirations unlabored.  Gait: Normal station and gait, no difficulty with toe or heel walk.   Skin: Dorsal lumbar skin negative for rashes, lesions, hairy patches and surgical scars. There is mild lumbar tenderness to palpation.  Range of motion: Lumbar range of motion is acceptable.  Spinal Balance: Global saggital and coronal spinal balance acceptable, not significant for scoliosis and kyphosis.  Musculoskeletal: No pain with the range of motion of the bilateral hips. No trochanteric tenderness to palpation.  Vascular: Bilateral lower extremities warm and well perfused, dorsalis pedis pulses 2+ bilaterally.  Neurological: Normal strength and tone in all major motor groups in the bilateral lower extremities. Normal sensation to light touch in the L2-S1 dermatomes bilaterally.  Deep tendon reflexes symmetric 2+ in the bilateral lower extremities.  Negative Babinski bilaterally. Straight leg raise positive on right.    IMAGING:      Today I personally reviewed AP, Lat and Flex/Ex  upright L-spine films that demonstrate minimal degenerative changes.       There is no height or weight on file to calculate BMI.    Hemoglobin A1C   Date Value Ref Range Status   08/30/2023 4.9 4.0 - 5.6 % Final     Comment:     ADA Screening Guidelines:  5.7-6.4%  Consistent with prediabetes  >or=6.5%  Consistent with diabetes    High levels of fetal hemoglobin interfere with the HbA1C  assay. Heterozygous hemoglobin variants  (HbS, HgC, etc)do  not significantly interfere with this assay.   However, presence of multiple variants may affect accuracy.             ASSESSMENT/PLAN:    There are no diagnoses linked to this encounter.    Today we discussed at length all of the different treatment options including anti-inflammatories, acetaminophen, rest, ice, heat, physical therapy including strengthening and stretching exercises, home exercises, ROM, aerobic conditioning, aqua therapy, other modalities including ultrasound, massage, and dry needling, epidural steroid injections and finally surgical intervention.      Pt with +HLA27 presents with chronic low back pain and radiculopathy. Failure of conservative rx. Will obtain lumbar MRI to further evaluate and call with results. Will send medrol dose pack to pharmacy.

## 2023-09-01 ENCOUNTER — OFFICE VISIT (OUTPATIENT)
Dept: ORTHOPEDICS | Facility: CLINIC | Age: 28
End: 2023-09-01
Payer: COMMERCIAL

## 2023-09-01 ENCOUNTER — HOSPITAL ENCOUNTER (OUTPATIENT)
Dept: RADIOLOGY | Facility: HOSPITAL | Age: 28
Discharge: HOME | End: 2023-09-01
Attending: ORTHOPAEDIC SURGERY
Payer: COMMERCIAL

## 2023-09-01 VITALS — WEIGHT: 160.06 LBS | BODY MASS INDEX: 25.72 KG/M2 | HEIGHT: 66 IN

## 2023-09-01 DIAGNOSIS — M54.50 CHRONIC BILATERAL LOW BACK PAIN, UNSPECIFIED WHETHER SCIATICA PRESENT: ICD-10-CM

## 2023-09-01 DIAGNOSIS — G89.29 CHRONIC BILATERAL LOW BACK PAIN, UNSPECIFIED WHETHER SCIATICA PRESENT: ICD-10-CM

## 2023-09-01 DIAGNOSIS — M54.16 LUMBAR RADICULOPATHY, CHRONIC: Primary | ICD-10-CM

## 2023-09-01 DIAGNOSIS — M54.16 LUMBAR RADICULOPATHY, CHRONIC: ICD-10-CM

## 2023-09-01 PROCEDURE — 3008F BODY MASS INDEX DOCD: CPT | Mod: CPTII,S$GLB,, | Performed by: ORTHOPAEDIC SURGERY

## 2023-09-01 PROCEDURE — 99204 OFFICE O/P NEW MOD 45 MIN: CPT | Mod: S$GLB,,, | Performed by: ORTHOPAEDIC SURGERY

## 2023-09-01 PROCEDURE — 99999 PR PBB SHADOW E&M-EST. PATIENT-LVL III: CPT | Mod: PBBFAC,,, | Performed by: ORTHOPAEDIC SURGERY

## 2023-09-01 PROCEDURE — 3044F HG A1C LEVEL LT 7.0%: CPT | Mod: CPTII,S$GLB,, | Performed by: ORTHOPAEDIC SURGERY

## 2023-09-01 PROCEDURE — 99999 PR PBB SHADOW E&M-EST. PATIENT-LVL III: ICD-10-PCS | Mod: PBBFAC,,, | Performed by: ORTHOPAEDIC SURGERY

## 2023-09-01 PROCEDURE — 3044F PR MOST RECENT HEMOGLOBIN A1C LEVEL <7.0%: ICD-10-PCS | Mod: CPTII,S$GLB,, | Performed by: ORTHOPAEDIC SURGERY

## 2023-09-01 PROCEDURE — 3008F PR BODY MASS INDEX (BMI) DOCUMENTED: ICD-10-PCS | Mod: CPTII,S$GLB,, | Performed by: ORTHOPAEDIC SURGERY

## 2023-09-01 PROCEDURE — 72148 MRI LUMBAR SPINE WITHOUT CONTRAST: ICD-10-PCS | Mod: 26,,, | Performed by: RADIOLOGY

## 2023-09-01 PROCEDURE — 72148 MRI LUMBAR SPINE W/O DYE: CPT | Mod: TC

## 2023-09-01 PROCEDURE — 72148 MRI LUMBAR SPINE W/O DYE: CPT | Mod: 26,,, | Performed by: RADIOLOGY

## 2023-09-01 PROCEDURE — 1159F PR MEDICATION LIST DOCUMENTED IN MEDICAL RECORD: ICD-10-PCS | Mod: CPTII,S$GLB,, | Performed by: ORTHOPAEDIC SURGERY

## 2023-09-01 PROCEDURE — 1159F MED LIST DOCD IN RCRD: CPT | Mod: CPTII,S$GLB,, | Performed by: ORTHOPAEDIC SURGERY

## 2023-09-01 PROCEDURE — 99204 PR OFFICE/OUTPT VISIT, NEW, LEVL IV, 45-59 MIN: ICD-10-PCS | Mod: S$GLB,,, | Performed by: ORTHOPAEDIC SURGERY

## 2023-09-01 RX ORDER — METHYLPREDNISOLONE 4 MG/1
TABLET ORAL
Qty: 1 EACH | Refills: 0 | Status: SHIPPED | OUTPATIENT
Start: 2023-09-01 | End: 2023-09-22

## 2023-09-01 NOTE — TELEPHONE ENCOUNTER
Orders switched from [facility] to [facility] per patient request. I mailed a hard copy to his home in case there is an issue with [facility] receiving electronically.

## 2023-09-04 ENCOUNTER — PATIENT MESSAGE (OUTPATIENT)
Dept: ORTHOPEDICS | Facility: CLINIC | Age: 28
End: 2023-09-04
Payer: COMMERCIAL

## 2023-09-06 ENCOUNTER — PATIENT MESSAGE (OUTPATIENT)
Dept: RHEUMATOLOGY | Facility: CLINIC | Age: 28
End: 2023-09-06
Payer: COMMERCIAL

## 2023-09-06 NOTE — PROGRESS NOTES
Established Patient - Audio Only Telehealth Visit     The patient location is: home  The chief complaint leading to consultation is: MRI results  Visit type: Virtual visit with audio only (telephone)  Total time spent with patient: 10 min    The reason for the audio only service rather than synchronous audio and video virtual visit was related to technical difficulties or patient preference/necessity.     Each patient to whom I provide medical services by telemedicine is:  (1) informed of the relationship between the physician and patient and the respective role of any other health care provider with respect to management of the patient; and (2) notified that they may decline to receive medical services by telemedicine and may withdraw from such care at any time. Patient verbally consented to receive this service via voice-only telephone call.       DATE: 9/7/2023  PATIENT: Mannie Naqvi    Attending Physician: Smith Ramirez MD    HISTORY:  Mannie Naqvi is a 27 y.o. male who returns to me today for MRI results.  He was last seen by me 9/1/2023.  Today he is doing well but notes he continues to have low back and bilateral (R>L) leg pain.    The Patient denies myelopathic symptoms such as handwriting changes or difficulty with buttons/coins/keys. Denies perineal paresthesias, bowel/bladder dysfunction.    PMH/PSH/FamHx/SocHx:  Unchanged from prior visit    ROS:  REVIEW OF SYSTEMS:  Constitution: Negative. Negative for chills, fever and night sweats.   HENT: Negative for congestion and headaches.    Eyes: Negative for blurred vision, left vision loss and right vision loss.   Cardiovascular: Negative for chest pain and syncope.   Respiratory: Negative for cough and shortness of breath.    Endocrine: Negative for polydipsia, polyphagia and polyuria.   Hematologic/Lymphatic: Negative for bleeding problem. Does not bruise/bleed easily.   Skin: Negative for dry skin, itching and rash.   Musculoskeletal: Negative for falls  and muscle weakness.   Gastrointestinal: Negative for abdominal pain and bowel incontinence.   Allergic/Immunologic: Negative for hives and persistent infections.  Genitourinary: Negative for urinary retention/incontinence and nocturia.   Neurological: negative for disturbances in coordination, no myelopathic symptoms such as handwriting changes or difficulty with buttons, coins, keys or small objects. No loss of balance and seizures.   Psychiatric/Behavioral: Negative for depression. The patient does not have insomnia.   Denies perineal paresthesias, bowel or bladder incontinence    EXAM:  There were no vitals taken for this visit.    My physical examination was notable for the following findings:     Musculoskeletal and neuro exam stable      IMAGING:    Today I personally re- reviewed AP, Lat and Flex/Ex  upright L-spine that demonstrate minimal degenerative changes.      MRI lumbar demonstrates asymmetric sacroiliitis, incompletely visualized RIGHT greater than LEFT, with findings suspicious foraxial/ankylosing spondylitis.    There is no height or weight on file to calculate BMI.    Hemoglobin A1C   Date Value Ref Range Status   08/30/2023 4.9 4.0 - 5.6 % Final     Comment:     ADA Screening Guidelines:  5.7-6.4%  Consistent with prediabetes  >or=6.5%  Consistent with diabetes    High levels of fetal hemoglobin interfere with the HbA1C  assay. Heterozygous hemoglobin variants (HbS, HgC, etc)do  not significantly interfere with this assay.   However, presence of multiple variants may affect accuracy.           ASSESSMENT/PLAN:    There are no diagnoses linked to this encounter.    Today we discussed at length all of the different treatment options including anti-inflammatories, acetaminophen, rest, ice, heat, physical therapy including strengthening and stretching exercises, home exercises, ROM, aerobic conditioning, aqua therapy, other modalities including ultrasound, massage, and dry needling, epidural steroid  injections and finally surgical intervention.      Pt with +HLA27 presents with chronic low back pain and radiculopathy. Failure of conservative rx. Will schedule in pain management clinic for possible right SIJ injection vs right piriformis injection.                       This service was not originating from a related E/M service provided within the previous 7 days nor will  to an E/M service or procedure within the next 24 hours or my soonest available appointment.  Prevailing standard of care was able to be met in this audio-only visit.

## 2023-09-07 ENCOUNTER — OFFICE VISIT (OUTPATIENT)
Dept: ORTHOPEDICS | Facility: CLINIC | Age: 28
End: 2023-09-07
Payer: COMMERCIAL

## 2023-09-07 DIAGNOSIS — M46.1 SACROILIITIS: ICD-10-CM

## 2023-09-07 DIAGNOSIS — M54.16 LUMBAR RADICULOPATHY, CHRONIC: Primary | ICD-10-CM

## 2023-09-07 PROCEDURE — 3044F PR MOST RECENT HEMOGLOBIN A1C LEVEL <7.0%: ICD-10-PCS | Mod: CPTII,95,, | Performed by: ORTHOPAEDIC SURGERY

## 2023-09-07 PROCEDURE — 99213 PR OFFICE/OUTPT VISIT, EST, LEVL III, 20-29 MIN: ICD-10-PCS | Mod: 95,,, | Performed by: ORTHOPAEDIC SURGERY

## 2023-09-07 PROCEDURE — 99213 OFFICE O/P EST LOW 20 MIN: CPT | Mod: 95,,, | Performed by: ORTHOPAEDIC SURGERY

## 2023-09-07 PROCEDURE — 3044F HG A1C LEVEL LT 7.0%: CPT | Mod: CPTII,95,, | Performed by: ORTHOPAEDIC SURGERY

## 2023-09-11 ENCOUNTER — OFFICE VISIT (OUTPATIENT)
Dept: PAIN MEDICINE | Facility: CLINIC | Age: 28
End: 2023-09-11
Payer: COMMERCIAL

## 2023-09-11 ENCOUNTER — TELEPHONE (OUTPATIENT)
Dept: PAIN MEDICINE | Facility: CLINIC | Age: 28
End: 2023-09-11
Payer: COMMERCIAL

## 2023-09-11 ENCOUNTER — TELEPHONE (OUTPATIENT)
Dept: PAIN MEDICINE | Facility: OTHER | Age: 28
End: 2023-09-11
Payer: COMMERCIAL

## 2023-09-11 VITALS
WEIGHT: 162.94 LBS | TEMPERATURE: 99 F | HEART RATE: 74 BPM | SYSTOLIC BLOOD PRESSURE: 138 MMHG | RESPIRATION RATE: 19 BRPM | BODY MASS INDEX: 26.19 KG/M2 | DIASTOLIC BLOOD PRESSURE: 93 MMHG | HEIGHT: 66 IN

## 2023-09-11 DIAGNOSIS — M46.1 SACROILIITIS: Primary | ICD-10-CM

## 2023-09-11 DIAGNOSIS — M47.816 LUMBAR SPONDYLOSIS: ICD-10-CM

## 2023-09-11 DIAGNOSIS — M51.36 DDD (DEGENERATIVE DISC DISEASE), LUMBAR: ICD-10-CM

## 2023-09-11 PROCEDURE — 99204 OFFICE O/P NEW MOD 45 MIN: CPT | Mod: S$GLB,,, | Performed by: STUDENT IN AN ORGANIZED HEALTH CARE EDUCATION/TRAINING PROGRAM

## 2023-09-11 PROCEDURE — 1159F MED LIST DOCD IN RCRD: CPT | Mod: CPTII,S$GLB,, | Performed by: STUDENT IN AN ORGANIZED HEALTH CARE EDUCATION/TRAINING PROGRAM

## 2023-09-11 PROCEDURE — 3080F PR MOST RECENT DIASTOLIC BLOOD PRESSURE >= 90 MM HG: ICD-10-PCS | Mod: CPTII,S$GLB,, | Performed by: STUDENT IN AN ORGANIZED HEALTH CARE EDUCATION/TRAINING PROGRAM

## 2023-09-11 PROCEDURE — 3008F BODY MASS INDEX DOCD: CPT | Mod: CPTII,S$GLB,, | Performed by: STUDENT IN AN ORGANIZED HEALTH CARE EDUCATION/TRAINING PROGRAM

## 2023-09-11 PROCEDURE — 3075F SYST BP GE 130 - 139MM HG: CPT | Mod: CPTII,S$GLB,, | Performed by: STUDENT IN AN ORGANIZED HEALTH CARE EDUCATION/TRAINING PROGRAM

## 2023-09-11 PROCEDURE — 3008F PR BODY MASS INDEX (BMI) DOCUMENTED: ICD-10-PCS | Mod: CPTII,S$GLB,, | Performed by: STUDENT IN AN ORGANIZED HEALTH CARE EDUCATION/TRAINING PROGRAM

## 2023-09-11 PROCEDURE — 1160F PR REVIEW ALL MEDS BY PRESCRIBER/CLIN PHARMACIST DOCUMENTED: ICD-10-PCS | Mod: CPTII,S$GLB,, | Performed by: STUDENT IN AN ORGANIZED HEALTH CARE EDUCATION/TRAINING PROGRAM

## 2023-09-11 PROCEDURE — 3044F HG A1C LEVEL LT 7.0%: CPT | Mod: CPTII,S$GLB,, | Performed by: STUDENT IN AN ORGANIZED HEALTH CARE EDUCATION/TRAINING PROGRAM

## 2023-09-11 PROCEDURE — 3080F DIAST BP >= 90 MM HG: CPT | Mod: CPTII,S$GLB,, | Performed by: STUDENT IN AN ORGANIZED HEALTH CARE EDUCATION/TRAINING PROGRAM

## 2023-09-11 PROCEDURE — 99999 PR PBB SHADOW E&M-EST. PATIENT-LVL IV: CPT | Mod: PBBFAC,,, | Performed by: STUDENT IN AN ORGANIZED HEALTH CARE EDUCATION/TRAINING PROGRAM

## 2023-09-11 PROCEDURE — 99999 PR PBB SHADOW E&M-EST. PATIENT-LVL IV: ICD-10-PCS | Mod: PBBFAC,,, | Performed by: STUDENT IN AN ORGANIZED HEALTH CARE EDUCATION/TRAINING PROGRAM

## 2023-09-11 PROCEDURE — 1160F RVW MEDS BY RX/DR IN RCRD: CPT | Mod: CPTII,S$GLB,, | Performed by: STUDENT IN AN ORGANIZED HEALTH CARE EDUCATION/TRAINING PROGRAM

## 2023-09-11 PROCEDURE — 3044F PR MOST RECENT HEMOGLOBIN A1C LEVEL <7.0%: ICD-10-PCS | Mod: CPTII,S$GLB,, | Performed by: STUDENT IN AN ORGANIZED HEALTH CARE EDUCATION/TRAINING PROGRAM

## 2023-09-11 PROCEDURE — 3075F PR MOST RECENT SYSTOLIC BLOOD PRESS GE 130-139MM HG: ICD-10-PCS | Mod: CPTII,S$GLB,, | Performed by: STUDENT IN AN ORGANIZED HEALTH CARE EDUCATION/TRAINING PROGRAM

## 2023-09-11 PROCEDURE — 99204 PR OFFICE/OUTPT VISIT, NEW, LEVL IV, 45-59 MIN: ICD-10-PCS | Mod: S$GLB,,, | Performed by: STUDENT IN AN ORGANIZED HEALTH CARE EDUCATION/TRAINING PROGRAM

## 2023-09-11 PROCEDURE — 1159F PR MEDICATION LIST DOCUMENTED IN MEDICAL RECORD: ICD-10-PCS | Mod: CPTII,S$GLB,, | Performed by: STUDENT IN AN ORGANIZED HEALTH CARE EDUCATION/TRAINING PROGRAM

## 2023-09-11 RX ORDER — MELOXICAM 7.5 MG/1
7.5 TABLET ORAL DAILY PRN
Qty: 60 TABLET | Refills: 2 | Status: SHIPPED | OUTPATIENT
Start: 2023-09-11

## 2023-09-11 RX ORDER — CYCLOBENZAPRINE HCL 5 MG
10 TABLET ORAL NIGHTLY PRN
Qty: 60 TABLET | Refills: 2 | Status: SHIPPED | OUTPATIENT
Start: 2023-09-11

## 2023-09-11 NOTE — PROGRESS NOTES
Chronic Pain - New Consult    Referring Physician: Mariposa Jones,*    Chief Complaint:   Chief Complaint   Patient presents with    Low-back Pain        SUBJECTIVE:    Mannie Naqvi presents to the clinic for the evaluation of lower back pain. He states the current episode has been for 3 weeks. He stated when he was 14, he had pain in the middle of the night with his back burning.  He had seen a rheumatologist for this in the past.  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 He also states he has had iritis in January - March 2023. In his history, he has had pain going down his legs and occasional pain just in the back. He has had several bouts of physical therapy, which have helped with his lower back. The current pain started 3 weeks ago following no inciting event and symptoms have been worsening.The pain is located in the lower back area and radiates to the right leg and stops at the knee.  The pain is described as sharp and is rated as 7/10. The pain is rated with a score of  3/10 on the BEST day and a score of 8/10 on the WORST day.  Symptoms interfere with daily activity, sleeping, and work. The pain is exacerbated by walking, turning, getting out of bed.  The pain is mitigated by ice and stretching, and not moving. The patient reports 7 hours of uninterrupted sleep per night.    Patient denies urinary incontinence, bowel incontinence, and significant motor weakness.    Physical Therapy/Home Exercise: yes, completed physical therapy in 2022.  Still doing home exercises at home.      Pain Disability Index Review:      9/11/2023     9:59 AM   Last 3 PDI Scores   Pain Disability Index (PDI) 48       Pain Medications:   - cyclobenzaprine   - naproxen     report:  Reviewed and consistent with medication use as prescribed.    Pain Procedures:   None    Imaging:   MRI LUMBAR SPINE WITHOUT CONTRAST     CLINICAL HISTORY:  Lumbar radiculopathy, symptoms persist with  "conservative treatment; Radiculopathy, lumbar region     TECHNIQUE:  Multiplanar, multisequence MR images were acquired from the thoracolumbar junction to the sacrum     FINDINGS:  Alignment: Normal.     Vertebrae: 5 lumbar-type vertebral bodies. No aggressive marrow replacement process or fracture.Nobone marrow edema .     Discs: Satisfactory height and signal.     Cord: Normal. Conus terminates at .     Degenerative findings:     T12-L1: There is no focal disc herniation. No significant central canal narrowing . No significant neural foraminal narrowing.     L1-L2: There is no focal disc herniation. No significant central canal narrowing . No significant neural foraminal narrowing.     L2-L3: There is no focal disc herniation. No significant central canal narrowing . No significant neural foraminal narrowing.     L3-L4: There is no focal disc herniation. No significant central canal narrowing . No significant neural foraminal narrowing.     L4-L5: There is no focal disc herniation. No significant central canal narrowing . No significant neural foraminal narrowing.     L5-S1: There is no focal disc herniation. No significant central canal narrowing . No significant neural foraminal narrowing.     Paraspinal muscles & soft tissues: Unremarkable.     There is no definite increased signal intensity of the rim of the endplates of multiple thoracic vertebral bodies on STIR images, to suggest bone marrow edema or osteitis./MR "plan of "shiny Corner sign"     There is incompletely visualized asymmetric sacroiliitis RIGHT greater than LEFT.  Bone marrow edema/osteitis is identified on multiple slices with subchondral sclerosis, early.  Periarticular fat deposition is better seen on the LEFT.  There is suspicion for early bony bridging/ankylosis bilaterally.  Dedicated sacroiliac joint imaging recommended.     Impression:     No focal protrusion or extrusion of disc material.  No evidence for central or foraminal " stenosis.     Asymmetric sacroiliitis, incompletely visualized RIGHT greater than LEFT, with findings suspicious foraxial/ankylosing spondylitis.  Dedicated MRI of the sacroiliac joints recommended.        Electronically signed by: Tulio Edward MD  Date:                                            09/02/2023  Time:                                           08:34      XR SACROILIAC JOINTS 3 VIEWS     CLINICAL HISTORY:  Low back pain, unspecified     TECHNIQUE:  SI joints three views     COMPARISON:  08/26/2014 none     FINDINGS:  The SI joints are about maintained.  No fracture or dislocation.  No bone destruction identified     Impression:     See above        Electronically signed by: Devon Trujillo MD  Date:                                            08/30/2023  Time:                                           08:55      XR LUMBAR SPINE COMPLETE 5 VIEW     CLINICAL HISTORY:  Low back pain, unspecified     TECHNIQUE:  AP, lateral, spot and bilateral oblique views of the lumbar spine were performed.     COMPARISON:  08/26/2014     FINDINGS:  Vertebral bodies are intact.  Disc spaces are maintained.  Bony structures are intact.  No collapse or destruction is noted.     Impression:     See above        Electronically signed by: Richie Jean MD  Date:                                            08/30/2023  Time:                                           08:55    Past Medical History:   Diagnosis Date    ADD (attention deficit disorder) 08/24/2022    Alopecia 08/24/2022    Chronic mid back pain 08/24/2022    COVID-19 07/18/2020    Depression 08/24/2022     History reviewed. No pertinent surgical history.  Social History     Socioeconomic History    Marital status: Single   Tobacco Use    Smoking status: Never    Smokeless tobacco: Never   Substance and Sexual Activity    Alcohol use: Yes     Comment: 2/night    Drug use: Never    Sexual activity: Yes     Partners: Female     Family History   Problem Relation Age of  Onset    Hyperlipidemia Mother     Chronic back pain Father     Depression Sister     Chronic back pain Sister     Heart attack Maternal Grandmother 84    Hyperlipidemia Maternal Grandfather     Kidney disease Maternal Grandfather     Arthritis Paternal Grandfather     Heart attack Cousin        Review of patient's allergies indicates:  No Known Allergies    Current Outpatient Medications   Medication Sig    cyclobenzaprine (FLEXERIL) 5 MG tablet Take 2 tablets (10 mg total) by mouth nightly as needed for Muscle spasms.    dextroamphetamine-amphetamine (ADDERALL XR) 20 MG 24 hr capsule Take 1 capsule (20 mg total) by mouth every morning.    dextroamphetamine-amphetamine 10 mg Tab Take 1 tablet by mouth once daily at 6am.    diphth,pertus,acell,,tetanus (BOOSTRIX TDAP) 2.5-8-5 Lf-mcg-Lf/0.5mL Syrg injection Inject into the muscle.    finasteride (PROSCAR) 5 mg tablet Take 1 tablet by mouth once daily at 6am.    methylPREDNISolone (MEDROL DOSEPACK) 4 mg tablet use as directed    minoxidiL (LONITEN) 2.5 MG tablet Take 1 tablet by mouth once daily at 6am.    naproxen (NAPROSYN) 500 MG tablet Take 1 tablet (500 mg total) by mouth 2 (two) times daily with meals.    meloxicam (MOBIC) 7.5 MG tablet Take 1 tablet (7.5 mg total) by mouth daily as needed for Pain.     No current facility-administered medications for this visit.       REVIEW OF SYSTEMS:    GENERAL:  No weight loss, malaise or fevers.  HEENT:  Negative for frequent or significant headaches.  NECK:  Negative for lumps, goiter, pain and significant neck swelling.  RESPIRATORY:  Negative for cough, wheezing or shortness of breath.  CARDIOVASCULAR:  Negative for chest pain, leg swelling or palpitations.  GI:  Negative for abdominal discomfort, blood in stools or black stools or change in bowel habits.  MUSCULOSKELETAL:  See HPI.  SKIN:  Negative for lesions, rash, and itching.  PSYCH:  Negative for sleep disturbance, mood disorder and recent psychosocial  "stressors.  HEMATOLOGY/LYMPHOLOGY:  Negative for prolonged bleeding, bruising easily or swollen nodes.  NEURO:   No history of headaches, syncope, paralysis, seizures or tremors.  All other reviewed and negative other than HPI.    OBJECTIVE:    BP (!) 138/93 (BP Location: Left arm, Patient Position: Sitting)   Pulse 74   Temp 98.5 °F (36.9 °C) (Oral)   Resp 19   Ht 5' 6" (1.676 m)   Wt 73.9 kg (162 lb 14.7 oz)   BMI 26.30 kg/m²     PHYSICAL EXAMINATION:  General appearance: Well appearing, in no acute distress, alert and appropriately communicative.  Psych:  Mood and affect appropriate.  Skin: Skin color, texture, turgor normal, no rashes or lesions, in both upper and lower body.  Head/face:  Atraumatic, normocephalic.  Cor: regular rate  Pulm: non-labored breathing  GI: Abdomen non-distended and non-tender.  Back: Straight leg raising in the sitting and supine positions is negative to radicular pain. No pain to palpation over the spine or paraspinal muscles. Slight pain on extension/facet loading.  Extremities: Peripheral joint ROM is full and pain free without obvious instability or laxity in all four extremities. No deformities, edema, or skin discoloration. Good capillary refill.  Musculoskeletal:  hip, sacroiliac and knee provocative maneuvers are negative with the exception of positive DANNY bilaterally, + Yeoman's on the right, + Gaenslen on the right, + Tiffany finger test on the right. Bilateral upper and lower extremity strength is normal and symmetric.  No atrophy or tone abnormalities are noted.  Neuro: Bilateral upper and lower extremity coordination and muscle stretch reflexes are physiologic and symmetric.  Negative Clonus. No loss of sensation is noted.  Gait: Normal.      ASSESSMENT: 27 y.o. year old male with lower back pain, consistent with:     1. Sacroiliitis  Procedure Order to Pain Management    meloxicam (MOBIC) 7.5 MG tablet    cyclobenzaprine (FLEXERIL) 5 MG tablet      2. DDD " (degenerative disc disease), lumbar  Procedure Order to Pain Management    meloxicam (MOBIC) 7.5 MG tablet    cyclobenzaprine (FLEXERIL) 5 MG tablet      3. Lumbar spondylosis            IMPRESSION: Mr. Naqvi presents for pleasant 27 y.o gentleman who presents for lower back pain.  He has had a prior diagnosis of ankylosing spondylitis and has had lower back pain since he was 14 years old.  He has done physical therapy, home exercises, and over the counter pain medications with good relief, until the past 3 weeks.  He feels like he is not able to overcome his current exacerbation with physical therapy/medications alone. He is interested in interventions which could help him.  History, physical exam, and imaging are consistent with sacroiliitis right > left. Since he has done conservative measures, he would be a candidate for interventions at this point.     PLAN:   - I have stressed the importance of physical activity and a home exercise plan to help with pain and improve health.  - Patient can continue with medications for now since they are providing benefits, using them appropriately, and without side effects.  - schedule for right SI joint injection  - meloxicam 7.5mg daily as needed  - flexeril 10mg nightly as needed for muscular pain.  - RTC after his SI joint injection  - Counseled patient regarding the importance of activity modification and physical therapy.    The above plan and management options were discussed at length with patient. Patient is in agreement with the above and verbalized understanding. It will be communicated with the referring physician via electronic record, fax, or mail.    Ulices Osorio  09/11/2023

## 2023-09-11 NOTE — TELEPHONE ENCOUNTER
----- Message from Sil Miller LPN sent at 9/11/2023 12:08 PM CDT -----  Regarding: FW: Steroid Shot    ----- Message -----  From: Juvencio Lowe  Sent: 9/11/2023  10:59 AM CDT  To: [facility] Pain Management Procedures  Subject: Steroid Shot                                     Name of Who is Calling:  Patient          What is the request in detail:  Patient stated he would like to make an appointment for a steroid injection, Please give patient a call.            Can the clinic reply by [facility]: No            What Number to Call Back if not [facility]:[phone]

## 2023-09-12 RX ORDER — SODIUM CHLORIDE 9 MG/ML
INJECTION, SOLUTION INTRAVENOUS CONTINUOUS
Status: CANCELLED | OUTPATIENT
Start: 2023-09-12

## 2023-09-12 NOTE — PRE-PROCEDURE INSTRUCTIONS
9/12/23 @ 1535: Called pt with updated arrival time of 10:20am. Verbalized understanding.    The following was discussed with pt via phone and sent to pt portal. Pt verbalized understanding.    On the day of surgery please report to [facility] located at [address] [place] [address].  Please park in the lot in front of the building and enter at the main entrance. Proceed to the second floor for registration.    Arrival time: 12:20pm    You may not drive home after your procedure. You may not leave alone in an uber, taxi, etc.  You must be discharged to a responsible adult. Please remain under adult supervision for 24 hours.   If possible, please have your visitor &/or ride home stay during your visit.   The surgeon should speak with your visitors after your procedure.  All visitors must be 18 years of age or older. Please limit your visitors to max of 2 people.    Your fasting instructions are as follow:  No sedation.  You do not need to fast before this procedure.  You can eat and drink like normal.  You can drive yourself (with stipulations).  There are some rare cases where you may need to call an uber if you are unable to drive after the procedure due to weakness/dizziness.     Shower the night before and the morning of your procedure with antibacterial soap.   Please do not use antibacterial soap to wash your face. Use your regular face soap.  Do not apply any products to your skin nor your hair after you shower the morning of your procedure.  Products include oils, powders, lotion, deodorant, make-up, or sunscreen.    Wear loose, comfortable clothing.  No jewelry. No contacts. Bring glasses if necessary.  If you have dentures, please bring a case.  Please leave all valuables at home.    If you start to feel sick (fever, chills, coughing, etc) or start on any antibiotics, please contact your doctor's office at [phone] to reschedule.     Thanks,  RENÉE Mc  Pre-Admit Dept [facility]  [phone]

## 2023-09-13 ENCOUNTER — HOSPITAL ENCOUNTER (OUTPATIENT)
Facility: HOSPITAL | Age: 28
Discharge: HOME | End: 2023-09-13
Attending: ANESTHESIOLOGY | Admitting: ANESTHESIOLOGY
Payer: COMMERCIAL

## 2023-09-13 VITALS
SYSTOLIC BLOOD PRESSURE: 130 MMHG | RESPIRATION RATE: 17 BRPM | HEART RATE: 69 BPM | WEIGHT: 160 LBS | DIASTOLIC BLOOD PRESSURE: 87 MMHG | HEIGHT: 66 IN | TEMPERATURE: 98 F | OXYGEN SATURATION: 96 % | BODY MASS INDEX: 25.71 KG/M2

## 2023-09-13 DIAGNOSIS — G89.29 CHRONIC PAIN: ICD-10-CM

## 2023-09-13 DIAGNOSIS — M46.1 SACROILIITIS: Primary | ICD-10-CM

## 2023-09-13 PROCEDURE — 25500020 PHARM REV CODE 255: Performed by: ANESTHESIOLOGY

## 2023-09-13 PROCEDURE — 27096 PR INJECTION,SACROILIAC JOINT: ICD-10-PCS | Mod: RT,,, | Performed by: ANESTHESIOLOGY

## 2023-09-13 PROCEDURE — 25000003 PHARM REV CODE 250: Performed by: ANESTHESIOLOGY

## 2023-09-13 PROCEDURE — 27096 INJECT SACROILIAC JOINT: CPT | Mod: RT | Performed by: ANESTHESIOLOGY

## 2023-09-13 PROCEDURE — 63600175 PHARM REV CODE 636 W HCPCS: Performed by: ANESTHESIOLOGY

## 2023-09-13 PROCEDURE — 27096 INJECT SACROILIAC JOINT: CPT | Mod: RT,,, | Performed by: ANESTHESIOLOGY

## 2023-09-13 RX ORDER — TRIAMCINOLONE ACETONIDE 40 MG/ML
INJECTION, SUSPENSION INTRA-ARTICULAR; INTRAMUSCULAR
Status: DISCONTINUED | OUTPATIENT
Start: 2023-09-13 | End: 2023-09-13 | Stop reason: HOSPADM

## 2023-09-13 RX ORDER — ALPRAZOLAM 0.5 MG/1
0.5 TABLET, ORALLY DISINTEGRATING ORAL
Status: CANCELLED | OUTPATIENT
Start: 2023-09-13

## 2023-09-13 RX ORDER — ALPRAZOLAM 0.5 MG/1
0.5 TABLET ORAL
Status: CANCELLED | OUTPATIENT
Start: 2023-09-13

## 2023-09-13 RX ORDER — LIDOCAINE HYDROCHLORIDE 20 MG/ML
INJECTION, SOLUTION EPIDURAL; INFILTRATION; INTRACAUDAL; PERINEURAL
Status: DISCONTINUED | OUTPATIENT
Start: 2023-09-13 | End: 2023-09-13 | Stop reason: HOSPADM

## 2023-09-13 NOTE — OP NOTE
Sacroiliac Joint Injection under Fluoroscopic Guidance    The procedure, risks, benefits, and options were discussed with the patient. There are no contraindications to the procedure. The patent expressed understanding and agreed to the procedure. Informed written consent was obtained prior to the start of the procedure and can be found in the patient's chart.    PATIENT NAME: Mannie Naqvi   MRN: [id number]     DATE OF PROCEDURE: 09/13/2023    PROCEDURE: Right Sacroiliac Joint Injection under Fluoroscopic Guidance    PRE-OP DIAGNOSIS: Sacroiliitis [M46.1]    POST-OP DIAGNOSIS: Same    PHYSICIAN: Chao Nam MD    ASSISTANTS: Stalin Dalton M.D. ([facility])       MEDICATIONS INJECTED: Preservative-free Kenalog 40mg with 3cc of Bupivacine 0.25%     LOCAL ANESTHETIC INJECTED: Xylocaine 2%     SEDATION: None    ESTIMATED BLOOD LOSS: None    COMPLICATIONS: None    TECHNIQUE: Time-out was performed to identify the patient and procedure to be performed. With the patient laying in a prone position, the surgical area was prepped and draped in the usual sterile fashion using ChloraPrep and a fenestrated drape. The sacroiliac joint was determined under fluoroscopy guidance. Skin anesthesia was achieved by injecting Lidocaine 2% over the injection site. The sacroiliac joint was  then approached with a 25 gauge, 3.5 inch spinal quinke needle that was introduced under fluoroscopic guidance in the AP and Lateral views. Once the needle tip was in the area of the joint, and there was no blood, contrast dye Omnipaque (300mg/mL) was injected to confirm placement and there was no vascular runoff. Fluoroscopic imaging in the AP and lateral views revealed a clear outline of the joint space. 4 mL of the medication mixture listed above was injected slowly intraarticular and hector-articular. Displacement of the radio opaque contrast after injection of the medication confirmed that the medication went into the area of the joint.  The needles were removed and bleeding was nil.  A sterile dressing was applied. No specimens collected. The patient tolerated the procedure well.       The patient was monitored after the procedure in the recovery area. They were given post-procedure and discharge instructions to follow at home. The patient was discharged in a stable condition.    Stalin Dalton MD    I reviewed and edited the fellow's note. I conducted my own interview and physical examination. I agree with the findings. I was present and supervising all critical portions of the procedure.    Chao Nam MD

## 2023-09-13 NOTE — PLAN OF CARE
Patient discharge instuctions given. Patient verbalized understanding and all questions answered. VS stable,Patient did not receive sedation.  Patient lover extremity and mobility checked. Ambulates safely.  Patient walked to elevator for discharge without issue.

## 2023-09-13 NOTE — DISCHARGE SUMMARY
Discharge Note  Short Stay      SUMMARY     Admit Date: 9/13/2023    Attending Physician: Chao Nam MD      Discharge Physician: Stalin Dalton      Discharge Date: 9/13/2023 12:25 PM    Procedure(s) (LRB):  RT SI joint injection (Right)    Final Diagnosis: Sacroiliitis [M46.1]    Disposition: Home or self care    Patient Instructions:   Current Discharge Medication List        CONTINUE these medications which have NOT CHANGED    Details   cyclobenzaprine (FLEXERIL) 5 MG tablet Take 2 tablets (10 mg total) by mouth nightly as needed for Muscle spasms.  Qty: 60 tablet, Refills: 2    Associated Diagnoses: Sacroiliitis; DDD (degenerative disc disease), lumbar      dextroamphetamine-amphetamine (ADDERALL XR) 20 MG 24 hr capsule Take 1 capsule (20 mg total) by mouth every morning.  Qty: 1 capsule, Refills: 0      finasteride (PROSCAR) 5 mg tablet Take 1 tablet by mouth once daily at 6am.      minoxidiL (LONITEN) 2.5 MG tablet Take 1 tablet by mouth once daily at 6am.      dextroamphetamine-amphetamine 10 mg Tab Take 1 tablet by mouth once daily at 6am.      diphth,pertus,acell,,tetanus (BOOSTRIX TDAP) 2.5-8-5 Lf-mcg-Lf/0.5mL Syrg injection Inject into the muscle.  Qty: 0.5 mL, Refills: 0      meloxicam (MOBIC) 7.5 MG tablet Take 1 tablet (7.5 mg total) by mouth daily as needed for Pain.  Qty: 60 tablet, Refills: 2    Associated Diagnoses: Sacroiliitis; DDD (degenerative disc disease), lumbar      methylPREDNISolone (MEDROL DOSEPACK) 4 mg tablet use as directed  Qty: 1 each, Refills: 0      naproxen (NAPROSYN) 500 MG tablet Take 1 tablet (500 mg total) by mouth 2 (two) times daily with meals.  Qty: 60 tablet, Refills: 6    Associated Diagnoses: Ankylosing spondylitis                 Discharge Diagnosis: Sacroiliitis [M46.1]  Condition on Discharge: Stable with no complications to procedure   Diet on Discharge: Same as before.  Activity: as per instruction sheet.  Discharge to: Home with a responsible adult.  Follow  up: 2-4 weeks       Please call my office or pager at [phone] if experienced any weakness or loss of sensation, fever > 101.5, pain uncontrolled with oral medications, persistent nausea/vomiting/or diarrhea, redness or drainage from the incisions, or any other worrisome concerns. If physician on call was not reached or could not communicate with our office for any reason please go to the nearest emergency department

## 2023-09-13 NOTE — DISCHARGE INSTRUCTIONS
Home Care Instructions Pain Management:    1.  DIET:    You may resume your normal diet today.    2.  BATHING:    You may shower with luke warm water.    3.  DRESSING:    You may remove your bandage today.    4.  ACTIVITY LEVEL:      You may resume your normal activities 24 hours after your procedure.    5.  MEDICATIONS:    You may resume your normal medications today.    6.  SPECIAL INSTRUCTIONS:    No heat to the injection site for 24 hours including bath or shower, heating pad, moist heat or hot tubs.    Use an ice pack to the injection site for any pain or discomfort.  Apply ice packs for 20 minute intervals as needed.    If you have received any sedatives by mouth today, you can not drive for 12 hours.    If you have received sedation through an IV, you can not drive for 24 hours.    PLEASE CALL YOUR DOCTOR FOR THE FOLLOWIN.  Redness or swelling around the injection site.  2.  Fever of 101 degrees.  3.  Drainage (pus) from the injection site.  4.  For any continuous bleeding (some dried blood over the incision is normal.)    FOR EMERGENCIES:    If any unusual problems or difficulties occur during clinic hours, call [phone] or dial [phone].    Follow up with with your physician in 2-3 weeks.

## 2023-09-27 ENCOUNTER — OFFICE VISIT (OUTPATIENT)
Dept: PAIN MEDICINE | Facility: CLINIC | Age: 28
End: 2023-09-27
Payer: COMMERCIAL

## 2023-09-27 VITALS
WEIGHT: 160.06 LBS | DIASTOLIC BLOOD PRESSURE: 90 MMHG | HEART RATE: 76 BPM | SYSTOLIC BLOOD PRESSURE: 130 MMHG | BODY MASS INDEX: 25.83 KG/M2

## 2023-09-27 DIAGNOSIS — G89.29 CHRONIC RIGHT-SIDED LOW BACK PAIN WITHOUT SCIATICA: ICD-10-CM

## 2023-09-27 DIAGNOSIS — M54.50 CHRONIC RIGHT-SIDED LOW BACK PAIN WITHOUT SCIATICA: ICD-10-CM

## 2023-09-27 DIAGNOSIS — M79.18 MYOFASCIAL PAIN SYNDROME: Primary | ICD-10-CM

## 2023-09-27 PROCEDURE — 3044F PR MOST RECENT HEMOGLOBIN A1C LEVEL <7.0%: ICD-10-PCS | Mod: CPTII,S$GLB,, | Performed by: NURSE PRACTITIONER

## 2023-09-27 PROCEDURE — 99214 OFFICE O/P EST MOD 30 MIN: CPT | Mod: 25,S$GLB,, | Performed by: NURSE PRACTITIONER

## 2023-09-27 PROCEDURE — 1159F PR MEDICATION LIST DOCUMENTED IN MEDICAL RECORD: ICD-10-PCS | Mod: CPTII,S$GLB,, | Performed by: NURSE PRACTITIONER

## 2023-09-27 PROCEDURE — 20552 NJX 1/MLT TRIGGER POINT 1/2: CPT | Mod: S$GLB,,, | Performed by: NURSE PRACTITIONER

## 2023-09-27 PROCEDURE — 3080F PR MOST RECENT DIASTOLIC BLOOD PRESSURE >= 90 MM HG: ICD-10-PCS | Mod: CPTII,S$GLB,, | Performed by: NURSE PRACTITIONER

## 2023-09-27 PROCEDURE — 3008F BODY MASS INDEX DOCD: CPT | Mod: CPTII,S$GLB,, | Performed by: NURSE PRACTITIONER

## 2023-09-27 PROCEDURE — 99999 PR PBB SHADOW E&M-EST. PATIENT-LVL III: CPT | Mod: PBBFAC,,, | Performed by: NURSE PRACTITIONER

## 2023-09-27 PROCEDURE — 3080F DIAST BP >= 90 MM HG: CPT | Mod: CPTII,S$GLB,, | Performed by: NURSE PRACTITIONER

## 2023-09-27 PROCEDURE — 99999 PR PBB SHADOW E&M-EST. PATIENT-LVL III: ICD-10-PCS | Mod: PBBFAC,,, | Performed by: NURSE PRACTITIONER

## 2023-09-27 PROCEDURE — 3075F PR MOST RECENT SYSTOLIC BLOOD PRESS GE 130-139MM HG: ICD-10-PCS | Mod: CPTII,S$GLB,, | Performed by: NURSE PRACTITIONER

## 2023-09-27 PROCEDURE — 3008F PR BODY MASS INDEX (BMI) DOCUMENTED: ICD-10-PCS | Mod: CPTII,S$GLB,, | Performed by: NURSE PRACTITIONER

## 2023-09-27 PROCEDURE — 20552 TRIGGER POINT INJECTION: ICD-10-PCS | Mod: S$GLB,,, | Performed by: NURSE PRACTITIONER

## 2023-09-27 PROCEDURE — 99214 PR OFFICE/OUTPT VISIT, EST, LEVL IV, 30-39 MIN: ICD-10-PCS | Mod: 25,S$GLB,, | Performed by: NURSE PRACTITIONER

## 2023-09-27 PROCEDURE — 1159F MED LIST DOCD IN RCRD: CPT | Mod: CPTII,S$GLB,, | Performed by: NURSE PRACTITIONER

## 2023-09-27 PROCEDURE — 3075F SYST BP GE 130 - 139MM HG: CPT | Mod: CPTII,S$GLB,, | Performed by: NURSE PRACTITIONER

## 2023-09-27 PROCEDURE — 3044F HG A1C LEVEL LT 7.0%: CPT | Mod: CPTII,S$GLB,, | Performed by: NURSE PRACTITIONER

## 2023-09-27 RX ADMIN — DEXAMETHASONE SODIUM PHOSPHATE 4 MG: 4 INJECTION, SOLUTION INTRA-ARTICULAR; INTRALESIONAL; INTRAMUSCULAR; INTRAVENOUS; SOFT TISSUE at 02:09

## 2023-09-27 NOTE — PROGRESS NOTES
Chronic Pain - establish pain management clinic visit    Referring Physician: No ref. provider found    Chief Complaint:   Chief Complaint   Patient presents with    S/P        SUBJECTIVE:    Mannie Naqvi presents to the clinic for the evaluation of lower back pain. He states the current episode has been for 3 weeks. He stated when he was 14, he had pain in the middle of the night with his back burning.  He had seen a rheumatologist for this in the past.  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 He also states he has had iritis in January - March 2023. In his history, he has had pain going down his legs and occasional pain just in the back. He has had several bouts of physical therapy, which have helped with his lower back. The current pain started 3 weeks ago following no inciting event and symptoms have been worsening.The pain is located in the lower back area and radiates to the right leg and stops at the knee.  The pain is described as sharp and is rated as 7/10. The pain is rated with a score of  3/10 on the BEST day and a score of 8/10 on the WORST day.  Symptoms interfere with daily activity, sleeping, and work. The pain is exacerbated by walking, turning, getting out of bed.  The pain is mitigated by ice and stretching, and not moving. The patient reports 7 hours of uninterrupted sleep per night.    Patient denies urinary incontinence, bowel incontinence, and significant motor weakness.    Physical Therapy/Home Exercise: yes, completed physical therapy in 2022.  Still doing home exercises at home.      Pain Disability Index Review:      9/27/2023     2:45 PM 9/11/2023     9:59 AM   Last 3 PDI Scores   Pain Disability Index (PDI) 41 48     Interval updates 09/27/2023  27-year-old male that presents today for follow-up appointment he is status post a right SI joint injection he reports some relief lasting a couple days and then pain returned.  Reports continued pain  when exercising in the right lower lumbar spine near the posterior superior iliac spine.  He continues to report pain that radiates down his right leg stopping into his calf area.  Primary source of pain is right low back but does experience intermittent pain in the right leg.  He reports not taking the meloxicam 7.5 that was prescribed to him by Dr. Osorio.      Pain Medications:   - cyclobenzaprine   - naproxen     report:  Reviewed and consistent with medication use as prescribed.    Pain Procedures:   None    Imaging:   MRI LUMBAR SPINE WITHOUT CONTRAST     CLINICAL HISTORY:  Lumbar radiculopathy, symptoms persist with conservative treatment; Radiculopathy, lumbar region     TECHNIQUE:  Multiplanar, multisequence MR images were acquired from the thoracolumbar junction to the sacrum     FINDINGS:  Alignment: Normal.     Vertebrae: 5 lumbar-type vertebral bodies. No aggressive marrow replacement process or fracture.Nobone marrow edema .     Discs: Satisfactory height and signal.     Cord: Normal. Conus terminates at .     Degenerative findings:     T12-L1: There is no focal disc herniation. No significant central canal narrowing . No significant neural foraminal narrowing.     L1-L2: There is no focal disc herniation. No significant central canal narrowing . No significant neural foraminal narrowing.     L2-L3: There is no focal disc herniation. No significant central canal narrowing . No significant neural foraminal narrowing.     L3-L4: There is no focal disc herniation. No significant central canal narrowing . No significant neural foraminal narrowing.     L4-L5: There is no focal disc herniation. No significant central canal narrowing . No significant neural foraminal narrowing.     L5-S1: There is no focal disc herniation. No significant central canal narrowing . No significant neural foraminal narrowing.     Paraspinal muscles & soft tissues: Unremarkable.     There is no definite increased signal intensity  "of the rim of the endplates of multiple thoracic vertebral bodies on STIR images, to suggest bone marrow edema or osteitis./MR "plan of "shiny Corner sign"     There is incompletely visualized asymmetric sacroiliitis RIGHT greater than LEFT.  Bone marrow edema/osteitis is identified on multiple slices with subchondral sclerosis, early.  Periarticular fat deposition is better seen on the LEFT.  There is suspicion for early bony bridging/ankylosis bilaterally.  Dedicated sacroiliac joint imaging recommended.     Impression:     No focal protrusion or extrusion of disc material.  No evidence for central or foraminal stenosis.     Asymmetric sacroiliitis, incompletely visualized RIGHT greater than LEFT, with findings suspicious foraxial/ankylosing spondylitis.  Dedicated MRI of the sacroiliac joints recommended.        Electronically signed by: Tulio Edward MD  Date:                                            09/02/2023  Time:                                           08:34      XR SACROILIAC JOINTS 3 VIEWS     CLINICAL HISTORY:  Low back pain, unspecified     TECHNIQUE:  SI joints three views     COMPARISON:  08/26/2014 none     FINDINGS:  The SI joints are about maintained.  No fracture or dislocation.  No bone destruction identified     Impression:     See above        Electronically signed by: Devon Trujillo MD  Date:                                            08/30/2023  Time:                                           08:55      XR LUMBAR SPINE COMPLETE 5 VIEW     CLINICAL HISTORY:  Low back pain, unspecified     TECHNIQUE:  AP, lateral, spot and bilateral oblique views of the lumbar spine were performed.     COMPARISON:  08/26/2014     FINDINGS:  Vertebral bodies are intact.  Disc spaces are maintained.  Bony structures are intact.  No collapse or destruction is noted.     Impression:     See above        Electronically signed by: Richie Jean MD  Date:                                            " 08/30/2023  Time:                                           08:55    Past Medical History:   Diagnosis Date    ADD (attention deficit disorder) 08/24/2022    Alopecia 08/24/2022    Chronic mid back pain 08/24/2022    COVID-19 07/18/2020    Depression 08/24/2022     Past Surgical History:   Procedure Laterality Date    INJECTION, SACROILIAC JOINT Right 9/13/2023    Procedure: RT SI joint injection;  Surgeon: Chao Nam MD;  Location: [facility];  Service: Pain Management;  Laterality: Right;  15 mins     Social History     Socioeconomic History    Marital status: Single   Tobacco Use    Smoking status: Never    Smokeless tobacco: Never   Substance and Sexual Activity    Alcohol use: Yes     Comment: 2/night    Drug use: Never    Sexual activity: Yes     Partners: Female     Family History   Problem Relation Age of Onset    Hyperlipidemia Mother     Chronic back pain Father     Depression Sister     Chronic back pain Sister     Heart attack Maternal Grandmother 84    Hyperlipidemia Maternal Grandfather     Kidney disease Maternal Grandfather     Arthritis Paternal Grandfather     Heart attack Cousin        Review of patient's allergies indicates:  No Known Allergies    Current Outpatient Medications   Medication Sig    cyclobenzaprine (FLEXERIL) 5 MG tablet Take 2 tablets (10 mg total) by mouth nightly as needed for Muscle spasms.    dextroamphetamine-amphetamine (ADDERALL XR) 20 MG 24 hr capsule Take 1 capsule (20 mg total) by mouth every morning.    dextroamphetamine-amphetamine 10 mg Tab Take 1 tablet by mouth once daily at 6am.    diphth,pertus,acell,,tetanus (BOOSTRIX TDAP) 2.5-8-5 Lf-mcg-Lf/0.5mL Syrg injection Inject into the muscle.    finasteride (PROSCAR) 5 mg tablet Take 1 tablet by mouth once daily at 6am.    meloxicam (MOBIC) 7.5 MG tablet Take 1 tablet (7.5 mg total) by mouth daily as needed for Pain.    minoxidiL (LONITEN) 2.5 MG tablet Take 1 tablet by mouth once daily at 6am.     naproxen (NAPROSYN) 500 MG tablet Take 1 tablet (500 mg total) by mouth 2 (two) times daily with meals.     Current Facility-Administered Medications   Medication    dexAMETHasone injection 4 mg       REVIEW OF SYSTEMS:    GENERAL:  No weight loss, malaise or fevers.  HEENT:  Negative for frequent or significant headaches.  NECK:  Negative for lumps, goiter, pain and significant neck swelling.  RESPIRATORY:  Negative for cough, wheezing or shortness of breath.  CARDIOVASCULAR:  Negative for chest pain, leg swelling or palpitations.  GI:  Negative for abdominal discomfort, blood in stools or black stools or change in bowel habits.  MUSCULOSKELETAL:  See HPI.  SKIN:  Negative for lesions, rash, and itching.  PSYCH:  Negative for sleep disturbance, mood disorder and recent psychosocial stressors.  HEMATOLOGY/LYMPHOLOGY:  Negative for prolonged bleeding, bruising easily or swollen nodes.  NEURO:   No history of headaches, syncope, paralysis, seizures or tremors.  All other reviewed and negative other than HPI.    OBJECTIVE:    BP (!) 130/90   Pulse 76   Wt 72.6 kg (160 lb 0.9 oz)   BMI 25.83 kg/m²     PHYSICAL EXAMINATION:  General appearance: Well appearing, in no acute distress, alert and appropriately communicative.  Psych:  Mood and affect appropriate.  Skin: Skin color, texture, turgor normal, no rashes or lesions, in both upper and lower body.  Head/face:  Atraumatic, normocephalic.  Cor: regular rate  Pulm: non-labored breathing  GI: Abdomen non-distended and non-tender.  Back: Straight leg raising in the sitting and supine positions is negative to radicular pain. No pain to palpation over the spine or paraspinal muscles. Slight pain on extension/facet loading.  Extremities: Peripheral joint ROM is full and pain free without obvious instability or laxity in all four extremities. No deformities, edema, or skin discoloration. Good capillary refill.  Musculoskeletal:  hip, sacroiliac and knee provocative  maneuvers are negative with the exception of positive DANNY bilaterally, + Yeoman's on the right, + Gaenslen on the right, + Tiffany finger test on the right. Bilateral upper and lower extremity strength is normal and symmetric.  No atrophy or tone abnormalities are noted.  Neuro: Bilateral upper and lower extremity coordination and muscle stretch reflexes are physiologic and symmetric.  Negative Clonus. No loss of sensation is noted.  Gait: Normal.      ASSESSMENT: 27 y.o. year old male with lower back pain, consistent with:     No diagnosis found.      IMPRESSION: Mr. Naqvi presents for pleasant 27 y.o gentleman who presents for lower back pain.  He has had a prior diagnosis of ankylosing spondylitis and has had lower back pain since he was 14 years old.  He has done physical therapy, home exercises, and over the counter pain medications with good relief, until the past 3 weeks.  He feels like he is not able to overcome his current exacerbation with physical therapy/medications alone. He is interested in interventions which could help him.  History, physical exam, and imaging are consistent with sacroiliitis right > left. Since he has done conservative measures, he would be a candidate for interventions at this point.     09/27/2023 that 27-year-old male status post a right SI joint injection with minimal relief.  Previous diagnosis of ankylosing spondylosis he has had lower back pain since he was 14 years old.  Upon history and exam I did feel tenderness and muscular knots in his right low back.  I do believe that his pain is coming from the SI joint but I also believe he has a myofascial component to his low back pain.  Recommended trigger point injections in the right lower lumbar sacral area to help with acute pain.  Verbalized understanding and agreed.    PLAN:   - I have stressed the importance of physical activity and a home exercise plan to help with pain and improve health.  - Patient can continue with  medications for now since they are providing benefits, using them appropriately, and without side effects.  - schedule for right trigger point injections in the lumbar sacral area  - recommended he start meloxicam 7.5mg daily as needed  - recommended he continue flexeril 10mg nightly as needed for muscular pain.  - RTC 2-4 weeks  - Counseled patient regarding the importance of activity modification and physical therapy.    The above plan and management options were discussed at length with patient. Patient is in agreement with the above and verbalized understanding. It will be communicated with the referring physician via electronic record, fax, or mail.        RAJINDER Stephens  Interventional Pain Management    09/27/2023

## 2023-09-28 RX ORDER — DEXAMETHASONE SODIUM PHOSPHATE 4 MG/ML
4 INJECTION, SOLUTION INTRA-ARTICULAR; INTRALESIONAL; INTRAMUSCULAR; INTRAVENOUS; SOFT TISSUE
Status: COMPLETED | OUTPATIENT
Start: 2023-09-28 | End: 2023-09-27

## 2023-09-28 NOTE — PROCEDURES
Trigger Point Injection    Performed by: Yuri Gleason FNP  Authorized by: Yuri Gleason FNP    Lumbar Paraspinal:  Right    Consent Done?:  Yes (Written)    Pre-Procedure:   Indications:  Pain relief  Site marked: the procedure site was marked     Timeout: prior to procedure the correct patient, procedure, and site was verified      Local anesthesia used?: Yes    Local anesthetic:  Bupivacaine 0.25% without epinephrine  Medications: 4 mg dexAMETHasone (DECADRON) injection 4 mg/mL  Lumbosacral:  Right

## 2023-10-04 ENCOUNTER — TELEPHONE (OUTPATIENT)
Dept: SPINE | Facility: CLINIC | Age: 28
End: 2023-10-04
Payer: COMMERCIAL

## 2023-10-05 ENCOUNTER — OFFICE VISIT (OUTPATIENT)
Dept: SPINE | Facility: CLINIC | Age: 28
End: 2023-10-05
Payer: COMMERCIAL

## 2023-10-05 VITALS
WEIGHT: 160.06 LBS | RESPIRATION RATE: 18 BRPM | HEART RATE: 83 BPM | HEIGHT: 66 IN | DIASTOLIC BLOOD PRESSURE: 88 MMHG | BODY MASS INDEX: 25.72 KG/M2 | SYSTOLIC BLOOD PRESSURE: 135 MMHG | OXYGEN SATURATION: 100 %

## 2023-10-05 DIAGNOSIS — Z15.89 HLA B27 (HLA B27 POSITIVE): ICD-10-CM

## 2023-10-05 DIAGNOSIS — M79.18 MYOFASCIAL PAIN SYNDROME: Primary | ICD-10-CM

## 2023-10-05 PROCEDURE — 3008F PR BODY MASS INDEX (BMI) DOCUMENTED: ICD-10-PCS | Mod: CPTII,S$GLB,, | Performed by: STUDENT IN AN ORGANIZED HEALTH CARE EDUCATION/TRAINING PROGRAM

## 2023-10-05 PROCEDURE — 3075F SYST BP GE 130 - 139MM HG: CPT | Mod: CPTII,S$GLB,, | Performed by: STUDENT IN AN ORGANIZED HEALTH CARE EDUCATION/TRAINING PROGRAM

## 2023-10-05 PROCEDURE — 1159F MED LIST DOCD IN RCRD: CPT | Mod: CPTII,S$GLB,, | Performed by: STUDENT IN AN ORGANIZED HEALTH CARE EDUCATION/TRAINING PROGRAM

## 2023-10-05 PROCEDURE — 1159F PR MEDICATION LIST DOCUMENTED IN MEDICAL RECORD: ICD-10-PCS | Mod: CPTII,S$GLB,, | Performed by: STUDENT IN AN ORGANIZED HEALTH CARE EDUCATION/TRAINING PROGRAM

## 2023-10-05 PROCEDURE — 3044F PR MOST RECENT HEMOGLOBIN A1C LEVEL <7.0%: ICD-10-PCS | Mod: CPTII,S$GLB,, | Performed by: STUDENT IN AN ORGANIZED HEALTH CARE EDUCATION/TRAINING PROGRAM

## 2023-10-05 PROCEDURE — 99214 PR OFFICE/OUTPT VISIT, EST, LEVL IV, 30-39 MIN: ICD-10-PCS | Mod: S$GLB,,, | Performed by: STUDENT IN AN ORGANIZED HEALTH CARE EDUCATION/TRAINING PROGRAM

## 2023-10-05 PROCEDURE — 1160F PR REVIEW ALL MEDS BY PRESCRIBER/CLIN PHARMACIST DOCUMENTED: ICD-10-PCS | Mod: CPTII,S$GLB,, | Performed by: STUDENT IN AN ORGANIZED HEALTH CARE EDUCATION/TRAINING PROGRAM

## 2023-10-05 PROCEDURE — 3079F PR MOST RECENT DIASTOLIC BLOOD PRESSURE 80-89 MM HG: ICD-10-PCS | Mod: CPTII,S$GLB,, | Performed by: STUDENT IN AN ORGANIZED HEALTH CARE EDUCATION/TRAINING PROGRAM

## 2023-10-05 PROCEDURE — 1160F RVW MEDS BY RX/DR IN RCRD: CPT | Mod: CPTII,S$GLB,, | Performed by: STUDENT IN AN ORGANIZED HEALTH CARE EDUCATION/TRAINING PROGRAM

## 2023-10-05 PROCEDURE — 99214 OFFICE O/P EST MOD 30 MIN: CPT | Mod: S$GLB,,, | Performed by: STUDENT IN AN ORGANIZED HEALTH CARE EDUCATION/TRAINING PROGRAM

## 2023-10-05 PROCEDURE — 3044F HG A1C LEVEL LT 7.0%: CPT | Mod: CPTII,S$GLB,, | Performed by: STUDENT IN AN ORGANIZED HEALTH CARE EDUCATION/TRAINING PROGRAM

## 2023-10-05 PROCEDURE — 3075F PR MOST RECENT SYSTOLIC BLOOD PRESS GE 130-139MM HG: ICD-10-PCS | Mod: CPTII,S$GLB,, | Performed by: STUDENT IN AN ORGANIZED HEALTH CARE EDUCATION/TRAINING PROGRAM

## 2023-10-05 PROCEDURE — 3008F BODY MASS INDEX DOCD: CPT | Mod: CPTII,S$GLB,, | Performed by: STUDENT IN AN ORGANIZED HEALTH CARE EDUCATION/TRAINING PROGRAM

## 2023-10-05 PROCEDURE — 3079F DIAST BP 80-89 MM HG: CPT | Mod: CPTII,S$GLB,, | Performed by: STUDENT IN AN ORGANIZED HEALTH CARE EDUCATION/TRAINING PROGRAM

## 2023-10-05 PROCEDURE — 99999 PR PBB SHADOW E&M-EST. PATIENT-LVL IV: ICD-10-PCS | Mod: PBBFAC,,, | Performed by: STUDENT IN AN ORGANIZED HEALTH CARE EDUCATION/TRAINING PROGRAM

## 2023-10-05 PROCEDURE — 99999 PR PBB SHADOW E&M-EST. PATIENT-LVL IV: CPT | Mod: PBBFAC,,, | Performed by: STUDENT IN AN ORGANIZED HEALTH CARE EDUCATION/TRAINING PROGRAM

## 2023-10-05 RX ORDER — TIZANIDINE 2 MG/1
2-4 TABLET ORAL EVERY 6 HOURS PRN
Qty: 90 TABLET | Refills: 2 | Status: SHIPPED | OUTPATIENT
Start: 2023-10-05

## 2023-10-05 NOTE — H&P (VIEW-ONLY)
"Chronic Pain - establish pain management clinic visit    Referring Physician: No ref. provider found    Chief Complaint:   Chief Complaint   Patient presents with    Back Pain      SUBJECTIVE:  Interval updates 09/27/2023  Mr Naqvi returns s/p right SI joint injection on 9/13/23 and s/p right lumbosacral trigger point injection on 9/28/23. He reports that he was more active over the weekend, but he doesn't remember it well because it was a bachelor party weekend.   He said the following week, he was miserable.  He subsequently had a trigger point injection on 9/28/23.  He did feel that this helped, however the effects waned quickly.  He does however report that he no longer has "deep pain" in his right SI joint but feels his pain is more superficial.  Pain is 7/10.    The above plan and management options were discussed at length with patient. Patient is in agreement with the above and verbalized understanding. It will be communicated with the referring physician via electronic record, fax, or mail.    Interval updates 09/27/2023  27-year-old male that presents today for follow-up appointment he is status post a right SI joint injection he reports some relief lasting a couple days and then pain returned.  Reports continued pain when exercising in the right lower lumbar spine near the posterior superior iliac spine.  He continues to report pain that radiates down his right leg stopping into his calf area.  Primary source of pain is right low back but does experience intermittent pain in the right leg.  He reports not taking the meloxicam 7.5 that was prescribed to him by Dr. Osorio.    INITIAL HPI 9/11/2023  Mannie Naqvi presents to the clinic for the evaluation of lower back pain. He states the current episode has been for 3 weeks. He stated when he was 14, he had pain in the middle of the night with his back burning.  He had seen a rheumatologist for this in the past.  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 He also states he has had iritis in January - March 2023. In his history, he has had pain going down his legs and occasional pain just in the back. He has had several bouts of physical therapy, which have helped with his lower back. The current pain started 3 weeks ago following no inciting event and symptoms have been worsening.The pain is located in the lower back area and radiates to the right leg and stops at the knee.  The pain is described as sharp and is rated as 7/10. The pain is rated with a score of  3/10 on the BEST day and a score of 8/10 on the WORST day.  Symptoms interfere with daily activity, sleeping, and work. The pain is exacerbated by walking, turning, getting out of bed.  The pain is mitigated by ice and stretching, and not moving. The patient reports 7 hours of uninterrupted sleep per night.    Patient denies urinary incontinence, bowel incontinence, and significant motor weakness.    Physical Therapy/Home Exercise: yes, completed physical therapy in 2022.  Still doing home exercises at home.      Pain Disability Index Review:      9/27/2023     2:45 PM 9/11/2023     9:59 AM   Last 3 PDI Scores   Pain Disability Index (PDI) 41 48       Pain Medications:   - cyclobenzaprine   - naproxen     report:  Reviewed and consistent with medication use as prescribed.    Pain Procedures:   9/13/23 - right SI joint injection  9/28/23 - right lumbosacral trigger point injection    Imaging:   MRI LUMBAR SPINE WITHOUT CONTRAST     CLINICAL HISTORY:  Lumbar radiculopathy, symptoms persist with conservative treatment; Radiculopathy, lumbar region     TECHNIQUE:  Multiplanar, multisequence MR images were acquired from the thoracolumbar junction to the sacrum     FINDINGS:  Alignment: Normal.     Vertebrae: 5 lumbar-type vertebral bodies. No aggressive marrow replacement process or fracture.Nobone marrow edema .     Discs: Satisfactory height and signal.     Cord: Normal.  "Conus terminates at .     Degenerative findings:     T12-L1: There is no focal disc herniation. No significant central canal narrowing . No significant neural foraminal narrowing.     L1-L2: There is no focal disc herniation. No significant central canal narrowing . No significant neural foraminal narrowing.     L2-L3: There is no focal disc herniation. No significant central canal narrowing . No significant neural foraminal narrowing.     L3-L4: There is no focal disc herniation. No significant central canal narrowing . No significant neural foraminal narrowing.     L4-L5: There is no focal disc herniation. No significant central canal narrowing . No significant neural foraminal narrowing.     L5-S1: There is no focal disc herniation. No significant central canal narrowing . No significant neural foraminal narrowing.     Paraspinal muscles & soft tissues: Unremarkable.     There is no definite increased signal intensity of the rim of the endplates of multiple thoracic vertebral bodies on STIR images, to suggest bone marrow edema or osteitis./MR "plan of "shiny Corner sign"     There is incompletely visualized asymmetric sacroiliitis RIGHT greater than LEFT.  Bone marrow edema/osteitis is identified on multiple slices with subchondral sclerosis, early.  Periarticular fat deposition is better seen on the LEFT.  There is suspicion for early bony bridging/ankylosis bilaterally.  Dedicated sacroiliac joint imaging recommended.     Impression:     No focal protrusion or extrusion of disc material.  No evidence for central or foraminal stenosis.     Asymmetric sacroiliitis, incompletely visualized RIGHT greater than LEFT, with findings suspicious foraxial/ankylosing spondylitis.  Dedicated MRI of the sacroiliac joints recommended.        Electronically signed by: Tulio Edward MD  Date:                                            09/02/2023  Time:                                           08:34      XR SACROILIAC JOINTS " 3 VIEWS     CLINICAL HISTORY:  Low back pain, unspecified     TECHNIQUE:  SI joints three views     COMPARISON:  08/26/2014 none     FINDINGS:  The SI joints are about maintained.  No fracture or dislocation.  No bone destruction identified     Impression:     See above        Electronically signed by: Devon Trujillo MD  Date:                                            08/30/2023  Time:                                           08:55      XR LUMBAR SPINE COMPLETE 5 VIEW     CLINICAL HISTORY:  Low back pain, unspecified     TECHNIQUE:  AP, lateral, spot and bilateral oblique views of the lumbar spine were performed.     COMPARISON:  08/26/2014     FINDINGS:  Vertebral bodies are intact.  Disc spaces are maintained.  Bony structures are intact.  No collapse or destruction is noted.     Impression:     See above        Electronically signed by: Richie Jean MD  Date:                                            08/30/2023  Time:                                           08:55    Past Medical History:   Diagnosis Date    ADD (attention deficit disorder) 08/24/2022    Alopecia 08/24/2022    Chronic mid back pain 08/24/2022    COVID-19 07/18/2020    Depression 08/24/2022     Past Surgical History:   Procedure Laterality Date    INJECTION, SACROILIAC JOINT Right 9/13/2023    Procedure: RT SI joint injection;  Surgeon: Chao Nam MD;  Location: [facility];  Service: Pain Management;  Laterality: Right;  15 mins     Social History     Socioeconomic History    Marital status: Single   Tobacco Use    Smoking status: Never    Smokeless tobacco: Never   Substance and Sexual Activity    Alcohol use: Yes     Comment: 2/night    Drug use: Never    Sexual activity: Yes     Partners: Female     Family History   Problem Relation Age of Onset    Hyperlipidemia Mother     Chronic back pain Father     Depression Sister     Chronic back pain Sister     Heart attack Maternal Grandmother 84    Hyperlipidemia Maternal  Grandfather     Kidney disease Maternal Grandfather     Arthritis Paternal Grandfather     Heart attack Cousin        Review of patient's allergies indicates:  No Known Allergies    Current Outpatient Medications   Medication Sig    cyclobenzaprine (FLEXERIL) 5 MG tablet Take 2 tablets (10 mg total) by mouth nightly as needed for Muscle spasms.    dextroamphetamine-amphetamine (ADDERALL XR) 20 MG 24 hr capsule Take 1 capsule (20 mg total) by mouth every morning.    dextroamphetamine-amphetamine 10 mg Tab Take 1 tablet by mouth once daily at 6am.    diphth,pertus,acell,,tetanus (BOOSTRIX TDAP) 2.5-8-5 Lf-mcg-Lf/0.5mL Syrg injection Inject into the muscle.    finasteride (PROSCAR) 5 mg tablet Take 1 tablet by mouth once daily at 6am.    meloxicam (MOBIC) 7.5 MG tablet Take 1 tablet (7.5 mg total) by mouth daily as needed for Pain.    minoxidiL (LONITEN) 2.5 MG tablet Take 1 tablet by mouth once daily at 6am.    naproxen (NAPROSYN) 500 MG tablet Take 1 tablet (500 mg total) by mouth 2 (two) times daily with meals.    tiZANidine (ZANAFLEX) 2 MG tablet Take 1-2 tablets (2-4 mg total) by mouth every 6 (six) hours as needed (muscular pain).     No current facility-administered medications for this visit.       REVIEW OF SYSTEMS:    GENERAL:  No weight loss, malaise or fevers.  HEENT:  Negative for frequent or significant headaches.  NECK:  Negative for lumps, goiter, pain and significant neck swelling.  RESPIRATORY:  Negative for cough, wheezing or shortness of breath.  CARDIOVASCULAR:  Negative for chest pain, leg swelling or palpitations.  GI:  Negative for abdominal discomfort, blood in stools or black stools or change in bowel habits.  MUSCULOSKELETAL:  See HPI.  SKIN:  Negative for lesions, rash, and itching.  PSYCH:  Negative for sleep disturbance, mood disorder and recent psychosocial stressors.  HEMATOLOGY/LYMPHOLOGY:  Negative for prolonged bleeding, bruising easily or swollen nodes.  NEURO:   No history of  "headaches, syncope, paralysis, seizures or tremors.  All other reviewed and negative other than HPI.    OBJECTIVE:    /88 (BP Location: Right arm, Patient Position: Sitting, BP Method: Medium (Automatic))   Pulse 83   Resp 18   Ht 5' 6" (1.676 m)   Wt 72.6 kg (160 lb 0.9 oz)   SpO2 100%   BMI 25.83 kg/m²     PHYSICAL EXAMINATION:  General appearance: Well appearing, in no acute distress, alert and appropriately communicative.  Psych:  Mood and affect appropriate.  Skin: Skin color, texture, turgor normal, no rashes or lesions, in both upper and lower body.  Head/face:  Atraumatic, normocephalic.  Cor: regular rate  Pulm: non-labored breathing  GI: Abdomen non-distended and non-tender.  Back: Straight leg raising in the sitting and supine positions is negative to radicular pain. Pain to palpation of right > left with palpable nodule bilaterally.  Slight pain with radiation down the right posterior thigh toward the knee with stretch of the piriformis. Slight pain on extension/facet loading.  Extremities: Peripheral joint ROM is full and pain free without obvious instability or laxity in all four extremities. No deformities, edema, or skin discoloration. Good capillary refill.  Musculoskeletal:  hip, sacroiliac and knee provocative maneuvers are negative with negative DANNY bilaterally, - Yeoman's on the right, - Gaenslen on the right, - Tiffany finger test on the right. Some positive Bilateral upper and lower extremity strength is normal and symmetric.  No atrophy or tone abnormalities are noted.  Neuro: Bilateral upper and lower extremity coordination and muscle stretch reflexes are physiologic and symmetric.  Negative Clonus. No loss of sensation is noted.  Gait: Normal.      ASSESSMENT: 27 y.o. year old male with lower back pain, consistent with:     1. Myofascial pain syndrome  Ambulatory referral/consult to Physical/Occupational Therapy    Procedure Order to Pain Management    tiZANidine (ZANAFLEX) 2 " MG tablet      2. HLA B27 (HLA B27 positive)            IMPRESSION: Mr. Naqvi presents for pleasant 27 y.o gentleman who presents for lower back pain.  He has had a prior diagnosis of ankylosing spondylitis and has had lower back pain since he was 14 years old.  He has done physical therapy, home exercises, and over the counter pain medications with good relief, until the past 3 weeks.  He feels like he is not able to overcome his current exacerbation with physical therapy/medications alone. He is interested in interventions which could help him.  History, physical exam, and imaging are consistent with sacroiliitis right > left. Since he has done conservative measures, he would be a candidate for interventions at this point.     09/27/2023 that 27-year-old male status post a right SI joint injection with minimal relief.  Previous diagnosis of ankylosing spondylosis he has had lower back pain since he was 14 years old.  Upon history and exam I did feel tenderness and muscular knots in his right low back.  I do believe that his pain is coming from the SI joint but I also believe he has a myofascial component to his low back pain.  Recommended trigger point injections in the right lower lumbar sacral area to help with acute pain.  Verbalized understanding and agreed.    10/5/2023:  Mr. Naqvi again presents with lower back pain right-greater-than-left.  He reports that the SI joint injection gave him minimal relief, however he feels that the pain is no longer deep but superficial.  He felt that the trigger point injection helped take away 90% of the pain, however the pain returned in a few days.  He states that he is continuing to actively participate in a home exercise program, however he is not having noticeable benefit.  He reports that he only recently started the meloxicam, and he has not tried muscle relaxants.  At this point, history and physical exam are consistent with myofascial pain.  He would benefit from  physical therapy and muscle relaxants.  We can also consider a piriformis injection which may help him.    PLAN:   - I have stressed the importance of physical activity and a home exercise plan to help with pain and improve health.  - Patient can continue with medications for now since they are providing benefits, using them appropriately, and without side effects.  - referral to physical therapy for lumbar muscular pain.  - continue meloxicam as prescribed  - start tizanidine 2mg BID  - schedule for right piriformis injection  - Counseled patient regarding the importance of activity modification and physical therapy.    Ulices Osorio MD  Interventional Pain Management

## 2023-10-05 NOTE — PROGRESS NOTES
"Chronic Pain - establish pain management clinic visit    Referring Physician: No ref. provider found    Chief Complaint:   Chief Complaint   Patient presents with    Back Pain      SUBJECTIVE:  Interval updates 09/27/2023  Mr Naqvi returns s/p right SI joint injection on 9/13/23 and s/p right lumbosacral trigger point injection on 9/28/23. He reports that he was more active over the weekend, but he doesn't remember it well because it was a bachelor party weekend.   He said the following week, he was miserable.  He subsequently had a trigger point injection on 9/28/23.  He did feel that this helped, however the effects waned quickly.  He does however report that he no longer has "deep pain" in his right SI joint but feels his pain is more superficial.  Pain is 7/10.    The above plan and management options were discussed at length with patient. Patient is in agreement with the above and verbalized understanding. It will be communicated with the referring physician via electronic record, fax, or mail.    Interval updates 09/27/2023  27-year-old male that presents today for follow-up appointment he is status post a right SI joint injection he reports some relief lasting a couple days and then pain returned.  Reports continued pain when exercising in the right lower lumbar spine near the posterior superior iliac spine.  He continues to report pain that radiates down his right leg stopping into his calf area.  Primary source of pain is right low back but does experience intermittent pain in the right leg.  He reports not taking the meloxicam 7.5 that was prescribed to him by Dr. Osorio.    INITIAL HPI 9/11/2023  Mannie Naqvi presents to the clinic for the evaluation of lower back pain. He states the current episode has been for 3 weeks. He stated when he was 14, he had pain in the middle of the night with his back burning.  He had seen a rheumatologist for this in the past.  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 He also states he has had iritis in January - March 2023. In his history, he has had pain going down his legs and occasional pain just in the back. He has had several bouts of physical therapy, which have helped with his lower back. The current pain started 3 weeks ago following no inciting event and symptoms have been worsening.The pain is located in the lower back area and radiates to the right leg and stops at the knee.  The pain is described as sharp and is rated as 7/10. The pain is rated with a score of  3/10 on the BEST day and a score of 8/10 on the WORST day.  Symptoms interfere with daily activity, sleeping, and work. The pain is exacerbated by walking, turning, getting out of bed.  The pain is mitigated by ice and stretching, and not moving. The patient reports 7 hours of uninterrupted sleep per night.    Patient denies urinary incontinence, bowel incontinence, and significant motor weakness.    Physical Therapy/Home Exercise: yes, completed physical therapy in 2022.  Still doing home exercises at home.      Pain Disability Index Review:      9/27/2023     2:45 PM 9/11/2023     9:59 AM   Last 3 PDI Scores   Pain Disability Index (PDI) 41 48       Pain Medications:   - cyclobenzaprine   - naproxen     report:  Reviewed and consistent with medication use as prescribed.    Pain Procedures:   9/13/23 - right SI joint injection  9/28/23 - right lumbosacral trigger point injection    Imaging:   MRI LUMBAR SPINE WITHOUT CONTRAST     CLINICAL HISTORY:  Lumbar radiculopathy, symptoms persist with conservative treatment; Radiculopathy, lumbar region     TECHNIQUE:  Multiplanar, multisequence MR images were acquired from the thoracolumbar junction to the sacrum     FINDINGS:  Alignment: Normal.     Vertebrae: 5 lumbar-type vertebral bodies. No aggressive marrow replacement process or fracture.Nobone marrow edema .     Discs: Satisfactory height and signal.     Cord: Normal.  "Conus terminates at .     Degenerative findings:     T12-L1: There is no focal disc herniation. No significant central canal narrowing . No significant neural foraminal narrowing.     L1-L2: There is no focal disc herniation. No significant central canal narrowing . No significant neural foraminal narrowing.     L2-L3: There is no focal disc herniation. No significant central canal narrowing . No significant neural foraminal narrowing.     L3-L4: There is no focal disc herniation. No significant central canal narrowing . No significant neural foraminal narrowing.     L4-L5: There is no focal disc herniation. No significant central canal narrowing . No significant neural foraminal narrowing.     L5-S1: There is no focal disc herniation. No significant central canal narrowing . No significant neural foraminal narrowing.     Paraspinal muscles & soft tissues: Unremarkable.     There is no definite increased signal intensity of the rim of the endplates of multiple thoracic vertebral bodies on STIR images, to suggest bone marrow edema or osteitis./MR "plan of "shiny Corner sign"     There is incompletely visualized asymmetric sacroiliitis RIGHT greater than LEFT.  Bone marrow edema/osteitis is identified on multiple slices with subchondral sclerosis, early.  Periarticular fat deposition is better seen on the LEFT.  There is suspicion for early bony bridging/ankylosis bilaterally.  Dedicated sacroiliac joint imaging recommended.     Impression:     No focal protrusion or extrusion of disc material.  No evidence for central or foraminal stenosis.     Asymmetric sacroiliitis, incompletely visualized RIGHT greater than LEFT, with findings suspicious foraxial/ankylosing spondylitis.  Dedicated MRI of the sacroiliac joints recommended.        Electronically signed by: Tulio Edward MD  Date:                                            09/02/2023  Time:                                           08:34      XR SACROILIAC JOINTS " 3 VIEWS     CLINICAL HISTORY:  Low back pain, unspecified     TECHNIQUE:  SI joints three views     COMPARISON:  08/26/2014 none     FINDINGS:  The SI joints are about maintained.  No fracture or dislocation.  No bone destruction identified     Impression:     See above        Electronically signed by: Devon Trujillo MD  Date:                                            08/30/2023  Time:                                           08:55      XR LUMBAR SPINE COMPLETE 5 VIEW     CLINICAL HISTORY:  Low back pain, unspecified     TECHNIQUE:  AP, lateral, spot and bilateral oblique views of the lumbar spine were performed.     COMPARISON:  08/26/2014     FINDINGS:  Vertebral bodies are intact.  Disc spaces are maintained.  Bony structures are intact.  No collapse or destruction is noted.     Impression:     See above        Electronically signed by: Richie Jean MD  Date:                                            08/30/2023  Time:                                           08:55    Past Medical History:   Diagnosis Date    ADD (attention deficit disorder) 08/24/2022    Alopecia 08/24/2022    Chronic mid back pain 08/24/2022    COVID-19 07/18/2020    Depression 08/24/2022     Past Surgical History:   Procedure Laterality Date    INJECTION, SACROILIAC JOINT Right 9/13/2023    Procedure: RT SI joint injection;  Surgeon: Chao Nam MD;  Location: [facility];  Service: Pain Management;  Laterality: Right;  15 mins     Social History     Socioeconomic History    Marital status: Single   Tobacco Use    Smoking status: Never    Smokeless tobacco: Never   Substance and Sexual Activity    Alcohol use: Yes     Comment: 2/night    Drug use: Never    Sexual activity: Yes     Partners: Female     Family History   Problem Relation Age of Onset    Hyperlipidemia Mother     Chronic back pain Father     Depression Sister     Chronic back pain Sister     Heart attack Maternal Grandmother 84    Hyperlipidemia Maternal  Grandfather     Kidney disease Maternal Grandfather     Arthritis Paternal Grandfather     Heart attack Cousin        Review of patient's allergies indicates:  No Known Allergies    Current Outpatient Medications   Medication Sig    cyclobenzaprine (FLEXERIL) 5 MG tablet Take 2 tablets (10 mg total) by mouth nightly as needed for Muscle spasms.    dextroamphetamine-amphetamine (ADDERALL XR) 20 MG 24 hr capsule Take 1 capsule (20 mg total) by mouth every morning.    dextroamphetamine-amphetamine 10 mg Tab Take 1 tablet by mouth once daily at 6am.    diphth,pertus,acell,,tetanus (BOOSTRIX TDAP) 2.5-8-5 Lf-mcg-Lf/0.5mL Syrg injection Inject into the muscle.    finasteride (PROSCAR) 5 mg tablet Take 1 tablet by mouth once daily at 6am.    meloxicam (MOBIC) 7.5 MG tablet Take 1 tablet (7.5 mg total) by mouth daily as needed for Pain.    minoxidiL (LONITEN) 2.5 MG tablet Take 1 tablet by mouth once daily at 6am.    naproxen (NAPROSYN) 500 MG tablet Take 1 tablet (500 mg total) by mouth 2 (two) times daily with meals.    tiZANidine (ZANAFLEX) 2 MG tablet Take 1-2 tablets (2-4 mg total) by mouth every 6 (six) hours as needed (muscular pain).     No current facility-administered medications for this visit.       REVIEW OF SYSTEMS:    GENERAL:  No weight loss, malaise or fevers.  HEENT:  Negative for frequent or significant headaches.  NECK:  Negative for lumps, goiter, pain and significant neck swelling.  RESPIRATORY:  Negative for cough, wheezing or shortness of breath.  CARDIOVASCULAR:  Negative for chest pain, leg swelling or palpitations.  GI:  Negative for abdominal discomfort, blood in stools or black stools or change in bowel habits.  MUSCULOSKELETAL:  See HPI.  SKIN:  Negative for lesions, rash, and itching.  PSYCH:  Negative for sleep disturbance, mood disorder and recent psychosocial stressors.  HEMATOLOGY/LYMPHOLOGY:  Negative for prolonged bleeding, bruising easily or swollen nodes.  NEURO:   No history of  "headaches, syncope, paralysis, seizures or tremors.  All other reviewed and negative other than HPI.    OBJECTIVE:    /88 (BP Location: Right arm, Patient Position: Sitting, BP Method: Medium (Automatic))   Pulse 83   Resp 18   Ht 5' 6" (1.676 m)   Wt 72.6 kg (160 lb 0.9 oz)   SpO2 100%   BMI 25.83 kg/m²     PHYSICAL EXAMINATION:  General appearance: Well appearing, in no acute distress, alert and appropriately communicative.  Psych:  Mood and affect appropriate.  Skin: Skin color, texture, turgor normal, no rashes or lesions, in both upper and lower body.  Head/face:  Atraumatic, normocephalic.  Cor: regular rate  Pulm: non-labored breathing  GI: Abdomen non-distended and non-tender.  Back: Straight leg raising in the sitting and supine positions is negative to radicular pain. Pain to palpation of right > left with palpable nodule bilaterally.  Slight pain with radiation down the right posterior thigh toward the knee with stretch of the piriformis. Slight pain on extension/facet loading.  Extremities: Peripheral joint ROM is full and pain free without obvious instability or laxity in all four extremities. No deformities, edema, or skin discoloration. Good capillary refill.  Musculoskeletal:  hip, sacroiliac and knee provocative maneuvers are negative with negative DANNY bilaterally, - Yeoman's on the right, - Gaenslen on the right, - Tiffany finger test on the right. Some positive Bilateral upper and lower extremity strength is normal and symmetric.  No atrophy or tone abnormalities are noted.  Neuro: Bilateral upper and lower extremity coordination and muscle stretch reflexes are physiologic and symmetric.  Negative Clonus. No loss of sensation is noted.  Gait: Normal.      ASSESSMENT: 27 y.o. year old male with lower back pain, consistent with:     1. Myofascial pain syndrome  Ambulatory referral/consult to Physical/Occupational Therapy    Procedure Order to Pain Management    tiZANidine (ZANAFLEX) 2 " MG tablet      2. HLA B27 (HLA B27 positive)            IMPRESSION: Mr. Naqvi presents for pleasant 27 y.o gentleman who presents for lower back pain.  He has had a prior diagnosis of ankylosing spondylitis and has had lower back pain since he was 14 years old.  He has done physical therapy, home exercises, and over the counter pain medications with good relief, until the past 3 weeks.  He feels like he is not able to overcome his current exacerbation with physical therapy/medications alone. He is interested in interventions which could help him.  History, physical exam, and imaging are consistent with sacroiliitis right > left. Since he has done conservative measures, he would be a candidate for interventions at this point.     09/27/2023 that 27-year-old male status post a right SI joint injection with minimal relief.  Previous diagnosis of ankylosing spondylosis he has had lower back pain since he was 14 years old.  Upon history and exam I did feel tenderness and muscular knots in his right low back.  I do believe that his pain is coming from the SI joint but I also believe he has a myofascial component to his low back pain.  Recommended trigger point injections in the right lower lumbar sacral area to help with acute pain.  Verbalized understanding and agreed.    10/5/2023:  Mr. Naqvi again presents with lower back pain right-greater-than-left.  He reports that the SI joint injection gave him minimal relief, however he feels that the pain is no longer deep but superficial.  He felt that the trigger point injection helped take away 90% of the pain, however the pain returned in a few days.  He states that he is continuing to actively participate in a home exercise program, however he is not having noticeable benefit.  He reports that he only recently started the meloxicam, and he has not tried muscle relaxants.  At this point, history and physical exam are consistent with myofascial pain.  He would benefit from  physical therapy and muscle relaxants.  We can also consider a piriformis injection which may help him.    PLAN:   - I have stressed the importance of physical activity and a home exercise plan to help with pain and improve health.  - Patient can continue with medications for now since they are providing benefits, using them appropriately, and without side effects.  - referral to physical therapy for lumbar muscular pain.  - continue meloxicam as prescribed  - start tizanidine 2mg BID  - schedule for right piriformis injection  - Counseled patient regarding the importance of activity modification and physical therapy.    Ulices Osorio MD  Interventional Pain Management

## 2023-10-06 ENCOUNTER — PATIENT MESSAGE (OUTPATIENT)
Dept: PAIN MEDICINE | Facility: OTHER | Age: 28
End: 2023-10-06
Payer: COMMERCIAL

## 2023-10-06 DIAGNOSIS — M79.18 MYOFASCIAL PAIN SYNDROME: Primary | ICD-10-CM

## 2023-10-12 ENCOUNTER — HOSPITAL ENCOUNTER (OUTPATIENT)
Facility: OTHER | Age: 28
Discharge: HOME | End: 2023-10-12
Attending: STUDENT IN AN ORGANIZED HEALTH CARE EDUCATION/TRAINING PROGRAM | Admitting: STUDENT IN AN ORGANIZED HEALTH CARE EDUCATION/TRAINING PROGRAM
Payer: COMMERCIAL

## 2023-10-12 VITALS
HEIGHT: 66 IN | SYSTOLIC BLOOD PRESSURE: 133 MMHG | RESPIRATION RATE: 16 BRPM | OXYGEN SATURATION: 100 % | BODY MASS INDEX: 25.71 KG/M2 | WEIGHT: 160 LBS | TEMPERATURE: 98 F | DIASTOLIC BLOOD PRESSURE: 82 MMHG | HEART RATE: 81 BPM

## 2023-10-12 DIAGNOSIS — G89.29 CHRONIC PAIN: ICD-10-CM

## 2023-10-12 DIAGNOSIS — M79.18 MYOFASCIAL PAIN SYNDROME: Primary | ICD-10-CM

## 2023-10-12 PROCEDURE — 63600175 PHARM REV CODE 636 W HCPCS: Performed by: STUDENT IN AN ORGANIZED HEALTH CARE EDUCATION/TRAINING PROGRAM

## 2023-10-12 PROCEDURE — 25500020 PHARM REV CODE 255: Performed by: STUDENT IN AN ORGANIZED HEALTH CARE EDUCATION/TRAINING PROGRAM

## 2023-10-12 PROCEDURE — 25000003 PHARM REV CODE 250: Performed by: STUDENT IN AN ORGANIZED HEALTH CARE EDUCATION/TRAINING PROGRAM

## 2023-10-12 PROCEDURE — 20552 NJX 1/MLT TRIGGER POINT 1/2: CPT | Mod: RT,,, | Performed by: STUDENT IN AN ORGANIZED HEALTH CARE EDUCATION/TRAINING PROGRAM

## 2023-10-12 PROCEDURE — 20552 NJX 1/MLT TRIGGER POINT 1/2: CPT | Mod: RT | Performed by: STUDENT IN AN ORGANIZED HEALTH CARE EDUCATION/TRAINING PROGRAM

## 2023-10-12 PROCEDURE — 20552 PR INJECT TRIGGER POINT, 1 OR 2: ICD-10-PCS | Mod: RT,,, | Performed by: STUDENT IN AN ORGANIZED HEALTH CARE EDUCATION/TRAINING PROGRAM

## 2023-10-12 RX ORDER — SODIUM CHLORIDE 9 MG/ML
INJECTION, SOLUTION INTRAVENOUS CONTINUOUS
Status: DISCONTINUED | OUTPATIENT
Start: 2023-10-12 | End: 2023-10-12 | Stop reason: HOSPADM

## 2023-10-12 RX ORDER — TRIAMCINOLONE ACETONIDE 40 MG/ML
INJECTION, SUSPENSION INTRA-ARTICULAR; INTRAMUSCULAR
Status: DISCONTINUED | OUTPATIENT
Start: 2023-10-12 | End: 2023-10-12 | Stop reason: HOSPADM

## 2023-10-12 RX ORDER — BUPIVACAINE HYDROCHLORIDE 2.5 MG/ML
INJECTION, SOLUTION EPIDURAL; INFILTRATION; INTRACAUDAL
Status: DISCONTINUED | OUTPATIENT
Start: 2023-10-12 | End: 2023-10-12 | Stop reason: HOSPADM

## 2023-10-12 RX ORDER — LIDOCAINE HYDROCHLORIDE 20 MG/ML
INJECTION, SOLUTION INFILTRATION; PERINEURAL
Status: DISCONTINUED | OUTPATIENT
Start: 2023-10-12 | End: 2023-10-12 | Stop reason: HOSPADM

## 2023-10-12 RX ORDER — DEXAMETHASONE SODIUM PHOSPHATE 10 MG/ML
INJECTION INTRAMUSCULAR; INTRAVENOUS
Status: DISCONTINUED | OUTPATIENT
Start: 2023-10-12 | End: 2023-10-12 | Stop reason: HOSPADM

## 2023-10-12 NOTE — INTERVAL H&P NOTE
The patient was examined and no significant changes were noted from the updated H&P or last clinic note.    The risks and benefits of this procedure, including alternative therapies, were discussed with the patient.  The discussion of risks included infection, bleeding, need for additional procedures or surgery, nerve damage, paralysis, adverse medication reaction(s), stroke, and if appropriate for the procedure, death.  Questions regarding the procedure, risks, expected outcome, and possible side effects were solicited and answered to Mannie's satisfaction.  Mannie Naqvi wishes to proceed with the injection or procedure as confirmed by written consent.

## 2023-10-12 NOTE — DISCHARGE INSTRUCTIONS

## 2023-10-12 NOTE — DISCHARGE SUMMARY
Discharge Note  Short Stay      SUMMARY     Admit Date: 10/12/2023    Attending Physician: Ulices Osorio MD PhD    Discharge Physician: Ulices Osorio      Discharge Date: 10/12/2023 10:08 AM    Procedure(s) (LRB):  INJECTION, RIGHT PIRIFORMIS AND TRIGGER POINT (Right)    Final Diagnosis: Myofascial pain syndrome [M79.18]    Disposition: Home or self care    Patient Instructions:   Discharge Medication List as of 10/12/2023  8:46 AM        CONTINUE these medications which have NOT CHANGED    Details   cyclobenzaprine (FLEXERIL) 5 MG tablet Take 2 tablets (10 mg total) by mouth nightly as needed for Muscle spasms., Starting Mon 9/11/2023, Normal      dextroamphetamine-amphetamine (ADDERALL XR) 20 MG 24 hr capsule Take 1 capsule (20 mg total) by mouth every morning., Starting Wed 8/24/2022, No Print      dextroamphetamine-amphetamine 10 mg Tab Take 1 tablet by mouth once daily at 6am., Starting Mon 8/1/2022, Historical Med      diphth,pertus,acell,,tetanus (BOOSTRIX TDAP) 2.5-8-5 Lf-mcg-Lf/0.5mL Syrg injection Inject into the muscle., Starting Wed 8/30/2023, Normal      finasteride (PROSCAR) 5 mg tablet Take 1 tablet by mouth once daily at 6am., Historical Med      meloxicam (MOBIC) 7.5 MG tablet Take 1 tablet (7.5 mg total) by mouth daily as needed for Pain., Starting Mon 9/11/2023, Normal      minoxidiL (LONITEN) 2.5 MG tablet Take 1 tablet by mouth once daily at 6am., Starting Wed 6/22/2022, Historical Med      naproxen (NAPROSYN) 500 MG tablet Take 1 tablet (500 mg total) by mouth 2 (two) times daily with meals., Starting 9/9/2014, Until Discontinued, Print      tiZANidine (ZANAFLEX) 2 MG tablet Take 1-2 tablets (2-4 mg total) by mouth every 6 (six) hours as needed (muscular pain)., Starting u 10/5/2023, Normal                 Discharge Diagnosis: Myofascial pain syndrome [M79.18]  Condition on Discharge: Stable with no complications to procedure   Diet on Discharge: Same as before.  Activity: as per instruction  sheet.  Discharge to: Home with a responsible adult.  Follow up: 2-4 weeks       Please call my office or pager at [phone] if experienced any weakness or loss of sensation, fever > 101.5, pain uncontrolled with oral medications, persistent nausea/vomiting/or diarrhea, redness or drainage from the incisions, or any other worrisome concerns. If physician on call was not reached or could not communicate with our office for any reason please go to the nearest emergency department      Ulices Osorio MD PhD

## 2023-10-12 NOTE — OP NOTE
Piriformis Muscle Injection and Cluneal Nerve Block under Fluoroscopic Guidance    The procedure, risks, benefits, and options were discussed with the patient. There are no contraindications to the procedure. The patent expressed understanding and agreed to the procedure. Informed written consent was obtained prior to the start of the procedure and can be found in the patient's chart.    PATIENT NAME: Mannie Naqvi   MRN: [id number]     DATE OF PROCEDURE: 10/12/2023    PROCEDURE: Right Piriformis Muscle Injection and Diagnostic/Therapeutic Right Cluneal Nerve Block under Fluoroscopic Guidance    PRE-OP DIAGNOSIS: Myofascial pain syndrome [M79.18]    POST-OP DIAGNOSIS: Same    PHYSICIAN: Ulices Osorio MD    ASSISTANTS: None     MEDICATIONS INJECTED: Preservative-free Kenalog 40mg with 9cc of Bupivacaine 0.25%     LOCAL ANESTHETIC INJECTED: Xylocaine 2%     SEDATION: None    ESTIMATED BLOOD LOSS: None    COMPLICATIONS: None    TECHNIQUE: Time-out was performed to identify the patient and procedure to be performed. With the patient laying in a prone position, the surgical area was prepped and draped in the usual sterile fashion using ChloraPrep and a fenestrated drape.The area overlying the right piriformis muscle was identified under fluoroscopy in the AP view. Skin anesthesia was achieved by injecting Lidocaine 2% over the injection sites. A 25 gauge,  3.5 inch spinal quinke needle was then advanced 3 cm inferior and 3 cm lateral to the inferior aspect of the SI joint. Once the piriformis muscle was thought to be encountered, Contrast dye  Omnipaque (240mg/mL) was injected to confirm placement and there was no vascular runoff. Confirmation of spread was identified within the piriformis muscle using AP fluoroscopic views. Then  4 mL of the medication mixture listed above was injected slowly. The needles were removed and bleeding was nil.    Attention was then given to the right cluneal nerve block. The area was  determined under fluoroscopy guidance. Skin anesthesia was achieved by injecting Lidocaine 2% over the injection site. A 25 gauge, 3.5 inch spinal quinke needle was advanced into the area of the cluneal nerves. Once the final needle tip position was confirmed on fluoroscopy, negative pressure was applied to confirm no intravascular placement. 4 mL of the medication mixture listed above was injected slowly. The needles were removed and bleeding was nil. A sterile dressing was applied. No specimens collected. The patient tolerated the procedure well.     PRE-PROCEDURE PAIN SCORE: 7/10    POST-PROCEDURE PAIN SCORE: 0/10    The patient was monitored after the procedure in the recovery area. They were given post-procedure and discharge instructions to follow at home. The patient was discharged in a stable condition.    Ulices Osorio MD

## 2023-10-20 ENCOUNTER — PATIENT MESSAGE (OUTPATIENT)
Dept: SPINE | Facility: CLINIC | Age: 28
End: 2023-10-20
Payer: COMMERCIAL

## 2023-10-26 ENCOUNTER — OFFICE VISIT (OUTPATIENT)
Dept: SPINE | Facility: CLINIC | Age: 28
End: 2023-10-26
Payer: COMMERCIAL

## 2023-10-26 VITALS
WEIGHT: 160.06 LBS | RESPIRATION RATE: 12 BRPM | TEMPERATURE: 98 F | OXYGEN SATURATION: 100 % | HEIGHT: 66 IN | DIASTOLIC BLOOD PRESSURE: 78 MMHG | HEART RATE: 95 BPM | BODY MASS INDEX: 25.72 KG/M2 | SYSTOLIC BLOOD PRESSURE: 130 MMHG

## 2023-10-26 DIAGNOSIS — M79.18 MYOFASCIAL PAIN SYNDROME: ICD-10-CM

## 2023-10-26 DIAGNOSIS — M45.8 ANKYLOSING SPONDYLITIS OF SACRAL REGION: Primary | ICD-10-CM

## 2023-10-26 DIAGNOSIS — Z15.89 HLA B27 (HLA B27 POSITIVE): ICD-10-CM

## 2023-10-26 PROCEDURE — 1160F RVW MEDS BY RX/DR IN RCRD: CPT | Mod: CPTII,S$GLB,, | Performed by: STUDENT IN AN ORGANIZED HEALTH CARE EDUCATION/TRAINING PROGRAM

## 2023-10-26 PROCEDURE — 1159F PR MEDICATION LIST DOCUMENTED IN MEDICAL RECORD: ICD-10-PCS | Mod: CPTII,S$GLB,, | Performed by: STUDENT IN AN ORGANIZED HEALTH CARE EDUCATION/TRAINING PROGRAM

## 2023-10-26 PROCEDURE — 1160F PR REVIEW ALL MEDS BY PRESCRIBER/CLIN PHARMACIST DOCUMENTED: ICD-10-PCS | Mod: CPTII,S$GLB,, | Performed by: STUDENT IN AN ORGANIZED HEALTH CARE EDUCATION/TRAINING PROGRAM

## 2023-10-26 PROCEDURE — 99213 OFFICE O/P EST LOW 20 MIN: CPT | Mod: 25,S$GLB,, | Performed by: STUDENT IN AN ORGANIZED HEALTH CARE EDUCATION/TRAINING PROGRAM

## 2023-10-26 PROCEDURE — 3078F PR MOST RECENT DIASTOLIC BLOOD PRESSURE < 80 MM HG: ICD-10-PCS | Mod: CPTII,S$GLB,, | Performed by: STUDENT IN AN ORGANIZED HEALTH CARE EDUCATION/TRAINING PROGRAM

## 2023-10-26 PROCEDURE — 3008F BODY MASS INDEX DOCD: CPT | Mod: CPTII,S$GLB,, | Performed by: STUDENT IN AN ORGANIZED HEALTH CARE EDUCATION/TRAINING PROGRAM

## 2023-10-26 PROCEDURE — 20553 TRIGGER POINT INJECTION: ICD-10-PCS | Mod: S$GLB,,, | Performed by: STUDENT IN AN ORGANIZED HEALTH CARE EDUCATION/TRAINING PROGRAM

## 2023-10-26 PROCEDURE — 20553 NJX 1/MLT TRIGGER POINTS 3/>: CPT | Mod: S$GLB,,, | Performed by: STUDENT IN AN ORGANIZED HEALTH CARE EDUCATION/TRAINING PROGRAM

## 2023-10-26 PROCEDURE — 3044F HG A1C LEVEL LT 7.0%: CPT | Mod: CPTII,S$GLB,, | Performed by: STUDENT IN AN ORGANIZED HEALTH CARE EDUCATION/TRAINING PROGRAM

## 2023-10-26 PROCEDURE — 3078F DIAST BP <80 MM HG: CPT | Mod: CPTII,S$GLB,, | Performed by: STUDENT IN AN ORGANIZED HEALTH CARE EDUCATION/TRAINING PROGRAM

## 2023-10-26 PROCEDURE — 99999 PR PBB SHADOW E&M-EST. PATIENT-LVL IV: ICD-10-PCS | Mod: PBBFAC,,, | Performed by: STUDENT IN AN ORGANIZED HEALTH CARE EDUCATION/TRAINING PROGRAM

## 2023-10-26 PROCEDURE — 1159F MED LIST DOCD IN RCRD: CPT | Mod: CPTII,S$GLB,, | Performed by: STUDENT IN AN ORGANIZED HEALTH CARE EDUCATION/TRAINING PROGRAM

## 2023-10-26 PROCEDURE — 3044F PR MOST RECENT HEMOGLOBIN A1C LEVEL <7.0%: ICD-10-PCS | Mod: CPTII,S$GLB,, | Performed by: STUDENT IN AN ORGANIZED HEALTH CARE EDUCATION/TRAINING PROGRAM

## 2023-10-26 PROCEDURE — 99213 PR OFFICE/OUTPT VISIT, EST, LEVL III, 20-29 MIN: ICD-10-PCS | Mod: 25,S$GLB,, | Performed by: STUDENT IN AN ORGANIZED HEALTH CARE EDUCATION/TRAINING PROGRAM

## 2023-10-26 PROCEDURE — 3075F SYST BP GE 130 - 139MM HG: CPT | Mod: CPTII,S$GLB,, | Performed by: STUDENT IN AN ORGANIZED HEALTH CARE EDUCATION/TRAINING PROGRAM

## 2023-10-26 PROCEDURE — 3008F PR BODY MASS INDEX (BMI) DOCUMENTED: ICD-10-PCS | Mod: CPTII,S$GLB,, | Performed by: STUDENT IN AN ORGANIZED HEALTH CARE EDUCATION/TRAINING PROGRAM

## 2023-10-26 PROCEDURE — 3075F PR MOST RECENT SYSTOLIC BLOOD PRESS GE 130-139MM HG: ICD-10-PCS | Mod: CPTII,S$GLB,, | Performed by: STUDENT IN AN ORGANIZED HEALTH CARE EDUCATION/TRAINING PROGRAM

## 2023-10-26 PROCEDURE — 99999 PR PBB SHADOW E&M-EST. PATIENT-LVL IV: CPT | Mod: PBBFAC,,, | Performed by: STUDENT IN AN ORGANIZED HEALTH CARE EDUCATION/TRAINING PROGRAM

## 2023-10-26 RX ORDER — METHYLPREDNISOLONE ACETATE 80 MG/ML
80 INJECTION, SUSPENSION INTRA-ARTICULAR; INTRALESIONAL; INTRAMUSCULAR; SOFT TISSUE
Status: COMPLETED | OUTPATIENT
Start: 2023-10-26 | End: 2023-10-26

## 2023-10-26 RX ADMIN — METHYLPREDNISOLONE ACETATE 80 MG: 80 INJECTION, SUSPENSION INTRA-ARTICULAR; INTRALESIONAL; INTRAMUSCULAR; SOFT TISSUE at 02:10

## 2023-10-26 RX ADMIN — METHYLPREDNISOLONE ACETATE 80 MG: 80 INJECTION, SUSPENSION INTRA-ARTICULAR; INTRALESIONAL; INTRAMUSCULAR; SOFT TISSUE at 04:10

## 2023-10-26 NOTE — PROCEDURES
Trigger Point Injection    Performed by: Ulices Osorio MD  Authorized by: Ulices Osorio MD      Consent Done?:  Yes (Written)    Pre-Procedure:   Indications:  Pain relief  Site marked: the procedure site was marked     Timeout: prior to procedure the correct patient, procedure, and site was verified      Local anesthesia used?: Yes    Anesthesia:  Local infiltration  Local anesthetic:  Bupivacaine 0.25% without epinephrine  Anesthetic total (ml):  9    Medications: 80 mg methylPREDNISolone acetate (DEPO-MEDROL) injection 80 mg/mL  Sacral:  Right  Lumbosacral:  Right  Hamstring:  Right

## 2023-10-26 NOTE — PROGRESS NOTES
"Chronic Pain - establish pain management clinic visit    Referring Physician: Ulices Osorio MD    Chief Complaint:   Chief Complaint   Patient presents with    Back Pain     Right side      SUBJECTIVE:  Interval updates 10/26/2023  Mr. Naqvi returns again s/p right piriformis injection on 10/12/23.  He reports that he continues to have pain in the right lower gluteal region.  He feels that the piriformis injection gave some relief, but non-sustained relief. Current pain is 5/10.  He feels that his pain improves with biking and loosening the right hip/buttock area, however he develops pain to the outside of his hip with this exercise.  He continues to feel that his palpable nodule causes him the most pain but waxes/wanes in terms of size and pain intensity.  He finds some benefit in taking the tizanidine and meloxicam as needed.    Interval updates 10/5/2023  Mr aNqvi returns s/p right SI joint injection on 9/13/23 and s/p right lumbosacral trigger point injection on 9/28/23. He reports that he was more active over the weekend, but he doesn't remember it well because it was a bachelor party weekend.   He said the following week, he was miserable.  He subsequently had a trigger point injection on 9/28/23.  He did feel that this helped, however the effects waned quickly.  He does however report that he no longer has "deep pain" in his right SI joint but feels his pain is more superficial.  Pain is 7/10.    Interval updates 09/27/2023  27-year-old male that presents today for follow-up appointment he is status post a right SI joint injection he reports some relief lasting a couple days and then pain returned.  Reports continued pain when exercising in the right lower lumbar spine near the posterior superior iliac spine.  He continues to report pain that radiates down his right leg stopping into his calf area.  Primary source of pain is right low back but does experience intermittent pain in the right leg.  He reports " not taking the meloxicam 7.5 that was prescribed to him by Dr. Osorio.    INITIAL HPI 9/11/2023  Mannie Naqvi presents to the clinic for the evaluation of lower back pain. He states the current episode has been for 3 weeks. He stated when he was 14, he had pain in the middle of the night with his back burning.  He had seen a rheumatologist for this in the past.  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 He also states he has had iritis in January - March 2023. In his history, he has had pain going down his legs and occasional pain just in the back. He has had several bouts of physical therapy, which have helped with his lower back. The current pain started 3 weeks ago following no inciting event and symptoms have been worsening.The pain is located in the lower back area and radiates to the right leg and stops at the knee.  The pain is described as sharp and is rated as 7/10. The pain is rated with a score of  3/10 on the BEST day and a score of 8/10 on the WORST day.  Symptoms interfere with daily activity, sleeping, and work. The pain is exacerbated by walking, turning, getting out of bed.  The pain is mitigated by ice and stretching, and not moving. The patient reports 7 hours of uninterrupted sleep per night.    Patient denies urinary incontinence, bowel incontinence, and significant motor weakness.    Physical Therapy/Home Exercise: yes, completed physical therapy in 2022.  Still doing home exercises at home.      Pain Disability Index Review:      10/26/2023     2:30 PM 9/27/2023     2:45 PM 9/11/2023     9:59 AM   Last 3 PDI Scores   Pain Disability Index (PDI) 24 41 [phone]       Pain Medications:   - cyclobenzaprine   - naproxen     report:  Reviewed and consistent with medication use as prescribed.    Pain Procedures:   9/13/23 - right SI joint injection  9/28/23 - right lumbosacral trigger point injection  10/12/23 - right piriformis injection    Imaging:   MRI LUMBAR  "SPINE WITHOUT CONTRAST     CLINICAL HISTORY:  Lumbar radiculopathy, symptoms persist with conservative treatment; Radiculopathy, lumbar region     TECHNIQUE:  Multiplanar, multisequence MR images were acquired from the thoracolumbar junction to the sacrum     FINDINGS:  Alignment: Normal.     Vertebrae: 5 lumbar-type vertebral bodies. No aggressive marrow replacement process or fracture.Nobone marrow edema .     Discs: Satisfactory height and signal.     Cord: Normal. Conus terminates at .     Degenerative findings:     T12-L1: There is no focal disc herniation. No significant central canal narrowing . No significant neural foraminal narrowing.     L1-L2: There is no focal disc herniation. No significant central canal narrowing . No significant neural foraminal narrowing.     L2-L3: There is no focal disc herniation. No significant central canal narrowing . No significant neural foraminal narrowing.     L3-L4: There is no focal disc herniation. No significant central canal narrowing . No significant neural foraminal narrowing.     L4-L5: There is no focal disc herniation. No significant central canal narrowing . No significant neural foraminal narrowing.     L5-S1: There is no focal disc herniation. No significant central canal narrowing . No significant neural foraminal narrowing.     Paraspinal muscles & soft tissues: Unremarkable.     There is no definite increased signal intensity of the rim of the endplates of multiple thoracic vertebral bodies on STIR images, to suggest bone marrow edema or osteitis./MR "plan of "shiny Corner sign"     There is incompletely visualized asymmetric sacroiliitis RIGHT greater than LEFT.  Bone marrow edema/osteitis is identified on multiple slices with subchondral sclerosis, early.  Periarticular fat deposition is better seen on the LEFT.  There is suspicion for early bony bridging/ankylosis bilaterally.  Dedicated sacroiliac joint imaging recommended.     Impression:     No focal " protrusion or extrusion of disc material.  No evidence for central or foraminal stenosis.     Asymmetric sacroiliitis, incompletely visualized RIGHT greater than LEFT, with findings suspicious foraxial/ankylosing spondylitis.  Dedicated MRI of the sacroiliac joints recommended.        Electronically signed by: Tulio Edward MD  Date:                                            09/02/2023  Time:                                           08:34      XR SACROILIAC JOINTS 3 VIEWS     CLINICAL HISTORY:  Low back pain, unspecified     TECHNIQUE:  SI joints three views     COMPARISON:  08/26/2014 none     FINDINGS:  The SI joints are about maintained.  No fracture or dislocation.  No bone destruction identified     Impression:     See above        Electronically signed by: Devon Trujillo MD  Date:                                            08/30/2023  Time:                                           08:55      XR LUMBAR SPINE COMPLETE 5 VIEW     CLINICAL HISTORY:  Low back pain, unspecified     TECHNIQUE:  AP, lateral, spot and bilateral oblique views of the lumbar spine were performed.     COMPARISON:  08/26/2014     FINDINGS:  Vertebral bodies are intact.  Disc spaces are maintained.  Bony structures are intact.  No collapse or destruction is noted.     Impression:     See above        Electronically signed by: Richie Jean MD  Date:                                            08/30/2023  Time:                                           08:55    Past Medical History:   Diagnosis Date    ADD (attention deficit disorder) 08/24/2022    Alopecia 08/24/2022    Chronic mid back pain 08/24/2022    COVID-19 07/18/2020    Depression 08/24/2022     Past Surgical History:   Procedure Laterality Date    INJECTION Right 10/12/2023    Procedure: INJECTION, RIGHT PIRIFORMIS AND TRIGGER POINT;  Surgeon: Ulices Osorio MD;  Location: [facility];  Service: Pain Management;  Laterality: Right;    INJECTION, SACROILIAC JOINT Right  9/13/2023    Procedure: RT SI joint injection;  Surgeon: Chao Nam MD;  Location: [facility];  Service: Pain Management;  Laterality: Right;  15 mins     Social History     Socioeconomic History    Marital status: Single   Tobacco Use    Smoking status: Never    Smokeless tobacco: Never   Substance and Sexual Activity    Alcohol use: Yes     Comment: 2/night    Drug use: Never    Sexual activity: Yes     Partners: Female     Family History   Problem Relation Age of Onset    Hyperlipidemia Mother     Chronic back pain Father     Depression Sister     Chronic back pain Sister     Heart attack Maternal Grandmother 84    Hyperlipidemia Maternal Grandfather     Kidney disease Maternal Grandfather     Arthritis Paternal Grandfather     Heart attack Cousin        Review of patient's allergies indicates:  No Known Allergies    Current Outpatient Medications   Medication Sig    cyclobenzaprine (FLEXERIL) 5 MG tablet Take 2 tablets (10 mg total) by mouth nightly as needed for Muscle spasms.    dextroamphetamine-amphetamine (ADDERALL XR) 20 MG 24 hr capsule Take 1 capsule (20 mg total) by mouth every morning.    dextroamphetamine-amphetamine 10 mg Tab Take 1 tablet by mouth once daily at 6am.    diphth,pertus,acell,,tetanus (BOOSTRIX TDAP) 2.5-8-5 Lf-mcg-Lf/0.5mL Syrg injection Inject into the muscle.    finasteride (PROSCAR) 5 mg tablet Take 1 tablet by mouth once daily at 6am.    meloxicam (MOBIC) 7.5 MG tablet Take 1 tablet (7.5 mg total) by mouth daily as needed for Pain.    minoxidiL (LONITEN) 2.5 MG tablet Take 1 tablet by mouth once daily at 6am.    naproxen (NAPROSYN) 500 MG tablet Take 1 tablet (500 mg total) by mouth 2 (two) times daily with meals.    tiZANidine (ZANAFLEX) 2 MG tablet Take 1-2 tablets (2-4 mg total) by mouth every 6 (six) hours as needed (muscular pain).     No current facility-administered medications for this visit.       REVIEW OF SYSTEMS:    GENERAL:  No weight loss, malaise or  "fevers.  HEENT:  Negative for frequent or significant headaches.  NECK:  Negative for lumps, goiter, pain and significant neck swelling.  RESPIRATORY:  Negative for cough, wheezing or shortness of breath.  CARDIOVASCULAR:  Negative for chest pain, leg swelling or palpitations.  GI:  Negative for abdominal discomfort, blood in stools or black stools or change in bowel habits.  MUSCULOSKELETAL:  See HPI.  SKIN:  Negative for lesions, rash, and itching.  PSYCH:  Negative for sleep disturbance, mood disorder and recent psychosocial stressors.  HEMATOLOGY/LYMPHOLOGY:  Negative for prolonged bleeding, bruising easily or swollen nodes.  NEURO:   No history of headaches, syncope, paralysis, seizures or tremors.  All other reviewed and negative other than HPI.    OBJECTIVE:    /78 (BP Location: Right arm, Patient Position: Sitting, BP Method: Small (Automatic))   Pulse 95   Temp 98 °F (36.7 °C) (Oral)   Resp 12   Ht 5' 6" (1.676 m)   Wt 72.6 kg (160 lb 0.9 oz)   SpO2 100%   BMI 25.83 kg/m²     PHYSICAL EXAMINATION:  General appearance: Well appearing, in no acute distress, alert and appropriately communicative.  Psych:  Mood and affect appropriate.  Skin: Skin color, texture, turgor normal, no rashes or lesions, in both upper and lower body.  Head/face:  Atraumatic, normocephalic.  Cor: regular rate  Pulm: non-labored breathing  GI: Abdomen non-distended and non-tender.  Back: Straight leg raising in the sitting and supine positions is negative to radicular pain. Pain to palpation of right > left with palpable nodule bilaterally.  Slight pain on extension/facet loading.  Extremities: Peripheral joint ROM is full and pain free without obvious instability or laxity in all four extremities. No deformities, edema, or skin discoloration. Good capillary refill.  Musculoskeletal:  hip, sacroiliac and knee provocative maneuvers are negative with negative DANNY bilaterally, - Yeoman's on the right, - Gaenslen on the " right, - Tiffany finger test on the right. Some positive Bilateral upper and lower extremity strength is normal and symmetric.  No atrophy or tone abnormalities are noted.  Neuro: Bilateral upper and lower extremity coordination and muscle stretch reflexes are physiologic and symmetric.  Negative Clonus. No loss of sensation is noted.  Gait: Normal.      ASSESSMENT: 27 y.o. year old male with lower back pain, consistent with:     1. Myofascial pain syndrome  Ambulatory referral/consult to Rheumatology      2. HLA B27 (HLA B27 positive)              IMPRESSION: Mr. Naqvi presents for pleasant 27 y.o gentleman who presents for lower back pain.  He has had a prior diagnosis of ankylosing spondylitis and has had lower back pain since he was 14 years old.  He has done physical therapy, home exercises, and over the counter pain medications with good relief, until the past 3 weeks.  He feels like he is not able to overcome his current exacerbation with physical therapy/medications alone. He is interested in interventions which could help him.  History, physical exam, and imaging are consistent with sacroiliitis right > left. Since he has done conservative measures, he would be a candidate for interventions at this point.     09/27/2023 that 27-year-old male status post a right SI joint injection with minimal relief.  Previous diagnosis of ankylosing spondylosis he has had lower back pain since he was 14 years old.  Upon history and exam I did feel tenderness and muscular knots in his right low back.  I do believe that his pain is coming from the SI joint but I also believe he has a myofascial component to his low back pain.  Recommended trigger point injections in the right lower lumbar sacral area to help with acute pain.  Verbalized understanding and agreed.    10/5/2023:  Mr. Naqvi again presents with lower back pain right-greater-than-left.  He reports that the SI joint injection gave him minimal relief, however he  feels that the pain is no longer deep but superficial.  He felt that the trigger point injection helped take away 90% of the pain, however the pain returned in a few days.  He states that he is continuing to actively participate in a home exercise program, however he is not having noticeable benefit.  He reports that he only recently started the meloxicam, and he has not tried muscle relaxants.  At this point, history and physical exam are consistent with myofascial pain.  He would benefit from physical therapy and muscle relaxants.  We can also consider a piriformis injection which may help him.    10/26/23: Mr. Naqvi again presents for right lower buttock/back pain.  He reports that his previous interventions have all provided him some relief but nonsustained relief.  He feels like the trigger point injection that he had gave him the best relief.  History and physical exam are consistent with ankylosing spondylitis and myofascial pain syndrome.  Imaging is consistent with right sacroiliitis.  He presents today for consideration of repeat trigger point injections, as his wedding is this weekend, and he is seeking even short-lived relief.  He has not followed up with a rheumatolgist for his ankylosing spondylitis for a long time and he has not been started on any disease modifying antirheumatic drugs. I feel that an additional trigger point injection may give him some short term relief, however he should follow up with a rheumatologist to consider additional treatment options.    PLAN:   - I have stressed the importance of physical activity and a home exercise plan to help with pain and improve health.  - Patient can continue with medications for now since they are providing benefits, using them appropriately, and without side effects.  - Additional trigger point done today in clinic  - referral to rheumatology for ankylosing spondylitis  - Counseled patient regarding the importance of activity modification and  physical therapy.  - follow up after rheumatology appointment as needed    Ulices Osorio MD  Interventional Pain Management    Patient Name: Mannie Naqvi  MRN: [id number]    INFORMED CONSENT: The procedure, risks, benefits and options were discussed with patient. There are no contraindications to the procedure. The patient expressed understanding and agreed to proceed. The personnel performing the procedure was discussed. I verify that I personally obtained Mannie Naqvi's consent prior to the start of the procedure and the signed consent can be found on the patient's chart.    Procedure Date: 10/26/2023    Anesthesia: None    Pre Procedure diagnosis: M79.1 Myalgia    Post-Procedure diagnosis: same    Sedation: None    PROCEDURE: Right Gluteal, lumbosacral, Hamstring TRIGGER POINT INJECTION  The patient was placed in a seated position and time out was perfomed. The patient's  trigger points were identified and marked within each muscle. The skin was prepped with chlorhexidine three times.  The muscle was grasped between the thumb and forefinger and a 27-gauge 1.5 inch  needle was advanced through the skin and subcutaneous tissues and into the muscle at each location. Aspiration for blood, air and CSF was negative.  A total of 10 ml of Bupivacaine 0.25% and 40mg Kenalog was divided among the trigger points. The needle was removed intact each time and bleeding was nil. No complications were evident.     Ulices Osorio MD  10/26/2023

## 2023-11-17 ENCOUNTER — PATIENT MESSAGE (OUTPATIENT)
Dept: SPINE | Facility: CLINIC | Age: 28
End: 2023-11-17
Payer: COMMERCIAL

## 2023-11-17 DIAGNOSIS — M79.18 MYOFASCIAL PAIN SYNDROME: Primary | ICD-10-CM

## 2023-12-04 PROBLEM — Z00.00 NORMAL PHYSICAL EXAM, ROUTINE: Status: RESOLVED | Noted: 2023-08-30 | Resolved: 2023-12-04

## 2024-06-10 NOTE — PROGRESS NOTES
Chronic Pain - establish pain management clinic visit    Referring Physician: No ref. provider found    Chief Complaint:   Chief Complaint   Patient presents with    Low-back Pain     Right         Interval Update 06/11/2024: Patient present here in clinic today for Right  low back pain,  onset 2-3 days pain is located in the right lower lumbar spine specifically in 1 spot  patient denies any recent incident or trauma patient is an active person and works out 3-4 times per week he denies any radicular symptoms denies any paresthesia like symptoms.  He is also complaining of pain in the right groin he reports that he was playing pickle ball and felt a pull in his groin right only.  Denies any radiating symptoms denies any profound weakness.  He is established office and was previously provided a trigger point injection in the right low back which helped.  He denies profound weakness denies bowel or bladder dysfunction denies any saddle anesthesia at this time.    SUBJECTIVE:    Mannie Naqvi presents to the clinic for the evaluation of lower back pain. He states the current episode has been for 3 weeks. He stated when he was 14, he had pain in the middle of the night with his back burning.  He had seen a rheumatologist for this in the past.  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 He also states he has had iritis in January - March 2023. In his history, he has had pain going down his legs and occasional pain just in the back. He has had several bouts of physical therapy, which have helped with his lower back. The current pain started 3 weeks ago following no inciting event and symptoms have been worsening.The pain is located in the lower back area and radiates to the right leg and stops at the knee.  The pain is described as sharp and is rated as 7/10. The pain is rated with a score of  3/10 on the BEST day and a score of 8/10 on the WORST day.  Symptoms interfere with daily  activity, sleeping, and work. The pain is exacerbated by walking, turning, getting out of bed.  The pain is mitigated by ice and stretching, and not moving. The patient reports 7 hours of uninterrupted sleep per night.    Patient denies urinary incontinence, bowel incontinence, and significant motor weakness.    Physical Therapy/Home Exercise: yes, completed physical therapy in 2022.  Still doing home exercises at home.      Pain Disability Index Review:      10/26/2023     2:30 PM 9/27/2023     2:45 PM 9/11/2023     9:59 AM   Last 3 PDI Scores   Pain Disability Index (PDI) 24 41 48     Interval updates 09/27/2023  27-year-old male that presents today for follow-up appointment he is status post a right SI joint injection he reports some relief lasting a couple days and then pain returned.  Reports continued pain when exercising in the right lower lumbar spine near the posterior superior iliac spine.  He continues to report pain that radiates down his right leg stopping into his calf area.  Primary source of pain is right low back but does experience intermittent pain in the right leg.  He reports not taking the meloxicam 7.5 that was prescribed to him by Dr. Osorio.      Pain Medications:   - cyclobenzaprine   - naproxen     report:  Reviewed and consistent with medication use as prescribed.    Pain Procedures:   -  10/26/2023 TPIs Dr. Devon Johns  -10/12/2023 Right Piriformis Muscle Injection  - 09/13/2023 Right Sacroiliac Joint Injection    Imaging:   MRI LUMBAR SPINE WITHOUT CONTRAST     CLINICAL HISTORY:  Lumbar radiculopathy, symptoms persist with conservative treatment; Radiculopathy, lumbar region     TECHNIQUE:  Multiplanar, multisequence MR images were acquired from the thoracolumbar junction to the sacrum     FINDINGS:  Alignment: Normal.     Vertebrae: 5 lumbar-type vertebral bodies. No aggressive marrow replacement process or fracture.Nobone marrow edema .     Discs: Satisfactory height and signal.    "  Cord: Normal. Conus terminates at .     Degenerative findings:     T12-L1: There is no focal disc herniation. No significant central canal narrowing . No significant neural foraminal narrowing.     L1-L2: There is no focal disc herniation. No significant central canal narrowing . No significant neural foraminal narrowing.     L2-L3: There is no focal disc herniation. No significant central canal narrowing . No significant neural foraminal narrowing.     L3-L4: There is no focal disc herniation. No significant central canal narrowing . No significant neural foraminal narrowing.     L4-L5: There is no focal disc herniation. No significant central canal narrowing . No significant neural foraminal narrowing.     L5-S1: There is no focal disc herniation. No significant central canal narrowing . No significant neural foraminal narrowing.     Paraspinal muscles & soft tissues: Unremarkable.     There is no definite increased signal intensity of the rim of the endplates of multiple thoracic vertebral bodies on STIR images, to suggest bone marrow edema or osteitis./MR "plan of "shiny Corner sign"     There is incompletely visualized asymmetric sacroiliitis RIGHT greater than LEFT.  Bone marrow edema/osteitis is identified on multiple slices with subchondral sclerosis, early.  Periarticular fat deposition is better seen on the LEFT.  There is suspicion for early bony bridging/ankylosis bilaterally.  Dedicated sacroiliac joint imaging recommended.     Impression:     No focal protrusion or extrusion of disc material.  No evidence for central or foraminal stenosis.     Asymmetric sacroiliitis, incompletely visualized RIGHT greater than LEFT, with findings suspicious foraxial/ankylosing spondylitis.  Dedicated MRI of the sacroiliac joints recommended.        Electronically signed by: Tulio Edward MD  Date:                                            09/02/2023  Time:                                           08:34      XR " SACROILIAC JOINTS 3 VIEWS     CLINICAL HISTORY:  Low back pain, unspecified     TECHNIQUE:  SI joints three views     COMPARISON:  08/26/2014 none     FINDINGS:  The SI joints are about maintained.  No fracture or dislocation.  No bone destruction identified     Impression:     See above        Electronically signed by: Devon Trujillo MD  Date:                                            08/30/2023  Time:                                           08:55      XR LUMBAR SPINE COMPLETE 5 VIEW     CLINICAL HISTORY:  Low back pain, unspecified     TECHNIQUE:  AP, lateral, spot and bilateral oblique views of the lumbar spine were performed.     COMPARISON:  08/26/2014     FINDINGS:  Vertebral bodies are intact.  Disc spaces are maintained.  Bony structures are intact.  No collapse or destruction is noted.     Impression:     See above        Electronically signed by: Richie Jean MD  Date:                                            08/30/2023  Time:                                           08:55    Past Medical History:   Diagnosis Date    ADD (attention deficit disorder) 08/24/2022    Alopecia 08/24/2022    Chronic mid back pain 08/24/2022    COVID-19 07/18/2020    Depression 08/24/2022     Past Surgical History:   Procedure Laterality Date    INJECTION Right 10/12/2023    Procedure: INJECTION, RIGHT PIRIFORMIS AND TRIGGER POINT;  Surgeon: Ulices Osorio MD;  Location: [facility];  Service: Pain Management;  Laterality: Right;    INJECTION, SACROILIAC JOINT Right 9/13/2023    Procedure: RT SI joint injection;  Surgeon: Chao Nam MD;  Location: [facility];  Service: Pain Management;  Laterality: Right;  15 mins     Social History     Socioeconomic History    Marital status: Single   Tobacco Use    Smoking status: Never    Smokeless tobacco: Never   Substance and Sexual Activity    Alcohol use: Yes     Comment: 2/night    Drug use: Never    Sexual activity: Yes     Partners: Female     Family History    Problem Relation Name Age of Onset    Hyperlipidemia Mother      Chronic back pain Father      Depression Sister      Chronic back pain Sister      Heart attack Maternal Grandmother  84    Hyperlipidemia Maternal Grandfather      Kidney disease Maternal Grandfather      Arthritis Paternal Grandfather      Heart attack Cousin         Review of patient's allergies indicates:  No Known Allergies    Current Outpatient Medications   Medication Sig    cyclobenzaprine (FLEXERIL) 5 MG tablet Take 2 tablets (10 mg total) by mouth nightly as needed for Muscle spasms.    dextroamphetamine-amphetamine (ADDERALL XR) 20 MG 24 hr capsule Take 1 capsule (20 mg total) by mouth every morning.    dextroamphetamine-amphetamine 10 mg Tab Take 1 tablet by mouth once daily at 6am.    diphth,pertus,acell,,tetanus (BOOSTRIX TDAP) 2.5-8-5 Lf-mcg-Lf/0.5mL Syrg injection Inject into the muscle.    finasteride (PROSCAR) 5 mg tablet Take 1 tablet by mouth once daily at 6am.    meloxicam (MOBIC) 7.5 MG tablet Take 1 tablet (7.5 mg total) by mouth daily as needed for Pain.    minoxidiL (LONITEN) 2.5 MG tablet Take 1 tablet by mouth once daily at 6am.    naproxen (NAPROSYN) 500 MG tablet Take 1 tablet (500 mg total) by mouth 2 (two) times daily with meals.    tiZANidine (ZANAFLEX) 2 MG tablet Take 1-2 tablets (2-4 mg total) by mouth every 6 (six) hours as needed (muscular pain).     No current facility-administered medications for this visit.       REVIEW OF SYSTEMS:    GENERAL:  No weight loss, malaise or fevers.  HEENT:  Negative for frequent or significant headaches.  NECK:  Negative for lumps, goiter, pain and significant neck swelling.  RESPIRATORY:  Negative for cough, wheezing or shortness of breath.  CARDIOVASCULAR:  Negative for chest pain, leg swelling or palpitations.  GI:  Negative for abdominal discomfort, blood in stools or black stools or change in bowel habits.  MUSCULOSKELETAL:  See HPI.  SKIN:  Negative for lesions, rash, and  "itching.  PSYCH:  Negative for sleep disturbance, mood disorder and recent psychosocial stressors.  HEMATOLOGY/LYMPHOLOGY:  Negative for prolonged bleeding, bruising easily or swollen nodes.  NEURO:   No history of headaches, syncope, paralysis, seizures or tremors.  All other reviewed and negative other than HPI.    OBJECTIVE:    BP (!) 148/88   Pulse 68   Ht 5' 6" (1.676 m)   Wt 78.5 kg (173 lb 2.7 oz)   BMI 27.95 kg/m²     PHYSICAL EXAMINATION:  General appearance: Well appearing, in no acute distress, alert and appropriately communicative.  Psych:  Mood and affect appropriate.  Skin: Skin color, texture, turgor normal, no rashes or lesions, in both upper and lower body.  Head/face:  Atraumatic, normocephalic.  Cor: regular rate  Pulm: non-labored breathing  GI: Abdomen non-distended and non-tender.  Back: Straight leg raising in the sitting and supine positions is negative to radicular pain. No pain to palpation over the spine or paraspinal muscles. Slight pain on extension/facet loading.  Extremities: Peripheral joint ROM is full and pain free without obvious instability or laxity in all four extremities. No deformities, edema, or skin discoloration. Good capillary refill.  Musculoskeletal:  hip, sacroiliac and knee provocative maneuvers are negative with the exception of positive DANNY bilaterally, + Yeoman's on the right, + Gaenslen on the right, + Tiffany finger test on the right. Bilateral upper and lower extremity strength is normal and symmetric.  No atrophy or tone abnormalities are noted.  Neuro: Bilateral upper and lower extremity coordination and muscle stretch reflexes are physiologic and symmetric.  Negative Clonus. No loss of sensation is noted.  Gait: Normal.      ASSESSMENT: 28 y.o. year old male with lower back pain, consistent with:     No diagnosis found.      IMPRESSION: Mr. Naqvi presents for pleasant 27 y.o gentleman who presents for lower back pain.  He has had a prior diagnosis of " ankylosing spondylitis and has had lower back pain since he was 14 years old.  He has done physical therapy, home exercises, and over the counter pain medications with good relief, until the past 3 weeks.  He feels like he is not able to overcome his current exacerbation with physical therapy/medications alone. He is interested in interventions which could help him.  History, physical exam, and imaging are consistent with sacroiliitis right > left. Since he has done conservative measures, he would be a candidate for interventions at this point.     09/27/2023 that 27-year-old male status post a right SI joint injection with minimal relief.  Previous diagnosis of ankylosing spondylosis he has had lower back pain since he was 14 years old.  Upon history and exam I did feel tenderness and muscular knots in his right low back.  I do believe that his pain is coming from the SI joint but I also believe he has a myofascial component to his low back pain.  Recommended trigger point injections in the right lower lumbar sacral area to help with acute pain.  Verbalized understanding and agreed.     06/11/2024-Mannie Naqvi is a 28 y.o. male who  has a past medical history of ADD (attention deficit disorder) (08/24/2022), Alopecia (08/24/2022), Chronic mid back pain (08/24/2022), COVID-19 (07/18/2020), and Depression (08/24/2022).  By history and examination this patient has chronic low back pain with radiculopathy.  The underlying cause cause is muscle dysfunction, muscles strain, and sacroiliitis.  Pathology is confirmed by imaging.  We discussed the underlying diagnoses and multiple treatment options including non-opioid medications, interventional procedures, home exercise, core muscle enhancement, activity modification, and weight loss.  The risks and benefits of each treatment option were discussed and all questions were answered.        PLAN:   - I have stressed the importance of physical activity and a home exercise  plan to help with pain and improve health.  - Patient can continue with medications for now since they are providing benefits, using them appropriately, and without side effects.  - schedule for right trigger point injections in the lumbar sacral area,  patient provided me with verbal consent to perform this injection today.  -  continue medications as previously prescribed  - RTC  3-4 months or sooner if needed  - Counseled patient regarding the importance of activity modification and physical therapy.    The above plan and management options were discussed at length with patient. Patient is in agreement with the above and verbalized understanding. It will be communicated with the referring physician via electronic record, fax, or mail.        RAJINDER Stephens  Interventional Pain Management    09/27/2023

## 2024-06-11 ENCOUNTER — OFFICE VISIT (OUTPATIENT)
Dept: PAIN MEDICINE | Facility: CLINIC | Age: 29
End: 2024-06-11
Payer: COMMERCIAL

## 2024-06-11 VITALS
WEIGHT: 173.19 LBS | HEART RATE: 68 BPM | HEIGHT: 66 IN | BODY MASS INDEX: 27.83 KG/M2 | DIASTOLIC BLOOD PRESSURE: 88 MMHG | SYSTOLIC BLOOD PRESSURE: 148 MMHG

## 2024-06-11 DIAGNOSIS — M54.50 CHRONIC RIGHT-SIDED LOW BACK PAIN WITHOUT SCIATICA: Primary | ICD-10-CM

## 2024-06-11 DIAGNOSIS — G89.29 CHRONIC RIGHT-SIDED LOW BACK PAIN WITHOUT SCIATICA: Primary | ICD-10-CM

## 2024-06-11 DIAGNOSIS — M79.18 MYOFASCIAL PAIN SYNDROME: ICD-10-CM

## 2024-06-11 PROCEDURE — 1160F RVW MEDS BY RX/DR IN RCRD: CPT | Mod: CPTII,S$GLB,, | Performed by: NURSE PRACTITIONER

## 2024-06-11 PROCEDURE — 99999 PR PBB SHADOW E&M-EST. PATIENT-LVL III: CPT | Mod: PBBFAC,,, | Performed by: NURSE PRACTITIONER

## 2024-06-11 PROCEDURE — 3008F BODY MASS INDEX DOCD: CPT | Mod: CPTII,S$GLB,, | Performed by: NURSE PRACTITIONER

## 2024-06-11 PROCEDURE — 3077F SYST BP >= 140 MM HG: CPT | Mod: CPTII,S$GLB,, | Performed by: NURSE PRACTITIONER

## 2024-06-11 PROCEDURE — 1159F MED LIST DOCD IN RCRD: CPT | Mod: CPTII,S$GLB,, | Performed by: NURSE PRACTITIONER

## 2024-06-11 PROCEDURE — 3079F DIAST BP 80-89 MM HG: CPT | Mod: CPTII,S$GLB,, | Performed by: NURSE PRACTITIONER

## 2024-06-11 PROCEDURE — 99214 OFFICE O/P EST MOD 30 MIN: CPT | Mod: 25,S$GLB,, | Performed by: NURSE PRACTITIONER

## 2024-06-11 PROCEDURE — 20553 NJX 1/MLT TRIGGER POINTS 3/>: CPT | Mod: S$GLB,,, | Performed by: NURSE PRACTITIONER

## 2024-06-11 RX ORDER — TRIAMCINOLONE ACETONIDE 40 MG/ML
40 INJECTION, SUSPENSION INTRA-ARTICULAR; INTRAMUSCULAR
Status: DISCONTINUED | OUTPATIENT
Start: 2024-06-11 | End: 2024-06-11 | Stop reason: HOSPADM

## 2024-06-11 RX ADMIN — TRIAMCINOLONE ACETONIDE 40 MG: 40 INJECTION, SUSPENSION INTRA-ARTICULAR; INTRAMUSCULAR at 10:06

## 2024-06-11 NOTE — PROCEDURES
Trigger Point Injection    Performed by: Yuri Gleason FNP  Authorized by: Yuri Gleason FNP    Lumbar Paraspinal:  Right    Consent Done?:  Yes (Verbal)    Pre-Procedure:   Indications:  Pain relief and pain  Site marked: the procedure site was marked (Verbal consent was given per the patient for this procedure.)     Timeout: prior to procedure the correct patient, procedure, and site was verified      Local anesthesia used?: Yes    Local anesthetic:  Bupivacaine 0.25% without epinephrine  Anesthetic total (ml):  3    Medications: 40 mg triamcinolone acetonide 40 mg/mL  Sacral:  Right  Lumbosacral:  Right

## 2024-07-30 DIAGNOSIS — Z15.89 HLA B27 (HLA B27 POSITIVE): Primary | ICD-10-CM

## 2024-07-30 DIAGNOSIS — M45.8 ANKYLOSING SPONDYLITIS OF SACRAL REGION: ICD-10-CM

## 2024-07-30 DIAGNOSIS — E87.1 HYPONATREMIA: ICD-10-CM

## 2024-09-06 ENCOUNTER — OFFICE VISIT (OUTPATIENT)
Dept: PRIMARY CARE CLINIC | Facility: CLINIC | Age: 29
End: 2024-09-06
Payer: COMMERCIAL

## 2024-09-06 ENCOUNTER — LAB VISIT (OUTPATIENT)
Dept: LAB | Facility: HOSPITAL | Age: 29
End: 2024-09-06
Attending: INTERNAL MEDICINE
Payer: COMMERCIAL

## 2024-09-06 VITALS
TEMPERATURE: 98 F | WEIGHT: 168.44 LBS | SYSTOLIC BLOOD PRESSURE: 116 MMHG | RESPIRATION RATE: 12 BRPM | HEIGHT: 66 IN | DIASTOLIC BLOOD PRESSURE: 82 MMHG | BODY MASS INDEX: 27.07 KG/M2 | OXYGEN SATURATION: 97 % | HEART RATE: 72 BPM

## 2024-09-06 DIAGNOSIS — Z13.220 SCREENING FOR LIPOID DISORDERS: ICD-10-CM

## 2024-09-06 DIAGNOSIS — G89.29 CHRONIC GROIN PAIN, RIGHT: ICD-10-CM

## 2024-09-06 DIAGNOSIS — Z00.00 NORMAL PHYSICAL EXAM, ROUTINE: Primary | ICD-10-CM

## 2024-09-06 DIAGNOSIS — Z00.00 NORMAL PHYSICAL EXAM, ROUTINE: ICD-10-CM

## 2024-09-06 DIAGNOSIS — R10.31 CHRONIC GROIN PAIN, RIGHT: ICD-10-CM

## 2024-09-06 DIAGNOSIS — R11.0 NAUSEA: ICD-10-CM

## 2024-09-06 LAB
ALBUMIN SERPL BCP-MCNC: 4.4 G/DL (ref 3.5–5.2)
ALP SERPL-CCNC: 62 U/L (ref 55–135)
ALT SERPL W/O P-5'-P-CCNC: 21 U/L (ref 10–44)
ANION GAP SERPL CALC-SCNC: 10 MMOL/L (ref 8–16)
AST SERPL-CCNC: 17 U/L (ref 10–40)
BASOPHILS # BLD AUTO: 0.03 K/UL (ref 0–0.2)
BASOPHILS NFR BLD: 0.6 % (ref 0–1.9)
BILIRUB SERPL-MCNC: 1 MG/DL (ref 0.1–1)
BUN SERPL-MCNC: 23 MG/DL (ref 6–20)
CALCIUM SERPL-MCNC: 10.2 MG/DL (ref 8.7–10.5)
CHLORIDE SERPL-SCNC: 106 MMOL/L (ref 95–110)
CHOLEST SERPL-MCNC: 226 MG/DL (ref 120–199)
CHOLEST/HDLC SERPL: 4.9 {RATIO} (ref 2–5)
CO2 SERPL-SCNC: 23 MMOL/L (ref 23–29)
CREAT SERPL-MCNC: 1.3 MG/DL (ref 0.5–1.4)
DIFFERENTIAL METHOD BLD: NORMAL
EOSINOPHIL # BLD AUTO: 0.1 K/UL (ref 0–0.5)
EOSINOPHIL NFR BLD: 1.5 % (ref 0–8)
ERYTHROCYTE [DISTWIDTH] IN BLOOD BY AUTOMATED COUNT: 12.8 % (ref 11.5–14.5)
EST. GFR  (NO RACE VARIABLE): >60 ML/MIN/1.73 M^2
ESTIMATED AVG GLUCOSE: 97 MG/DL (ref 68–131)
GLUCOSE SERPL-MCNC: 99 MG/DL (ref 70–110)
HBA1C MFR BLD: 5 % (ref 4–5.6)
HCT VFR BLD AUTO: 48.9 % (ref 40–54)
HDLC SERPL-MCNC: 46 MG/DL (ref 40–75)
HDLC SERPL: 20.4 % (ref 20–50)
HGB BLD-MCNC: 16.2 G/DL (ref 14–18)
IMM GRANULOCYTES # BLD AUTO: 0.02 K/UL (ref 0–0.04)
IMM GRANULOCYTES NFR BLD AUTO: 0.4 % (ref 0–0.5)
LDLC SERPL CALC-MCNC: 149.2 MG/DL (ref 63–159)
LYMPHOCYTES # BLD AUTO: 1.7 K/UL (ref 1–4.8)
LYMPHOCYTES NFR BLD: 32.8 % (ref 18–48)
MCH RBC QN AUTO: 28.7 PG (ref 27–31)
MCHC RBC AUTO-ENTMCNC: 33.1 G/DL (ref 32–36)
MCV RBC AUTO: 87 FL (ref 82–98)
MONOCYTES # BLD AUTO: 0.6 K/UL (ref 0.3–1)
MONOCYTES NFR BLD: 11.4 % (ref 4–15)
NEUTROPHILS # BLD AUTO: 2.8 K/UL (ref 1.8–7.7)
NEUTROPHILS NFR BLD: 53.3 % (ref 38–73)
NONHDLC SERPL-MCNC: 180 MG/DL
NRBC BLD-RTO: 0 /100 WBC
PLATELET # BLD AUTO: 306 K/UL (ref 150–450)
PMV BLD AUTO: 9.8 FL (ref 9.2–12.9)
POTASSIUM SERPL-SCNC: 5.2 MMOL/L (ref 3.5–5.1)
PROT SERPL-MCNC: 7.6 G/DL (ref 6–8.4)
RBC # BLD AUTO: 5.65 M/UL (ref 4.6–6.2)
SODIUM SERPL-SCNC: 139 MMOL/L (ref 136–145)
TRIGL SERPL-MCNC: 154 MG/DL (ref 30–150)
TSH SERPL DL<=0.005 MIU/L-ACNC: 1.28 UIU/ML (ref 0.4–4)
WBC # BLD AUTO: 5.25 K/UL (ref 3.9–12.7)

## 2024-09-06 PROCEDURE — 80061 LIPID PANEL: CPT | Performed by: INTERNAL MEDICINE

## 2024-09-06 PROCEDURE — 80053 COMPREHEN METABOLIC PANEL: CPT | Performed by: INTERNAL MEDICINE

## 2024-09-06 PROCEDURE — 85025 COMPLETE CBC W/AUTO DIFF WBC: CPT | Performed by: INTERNAL MEDICINE

## 2024-09-06 PROCEDURE — 83036 HEMOGLOBIN GLYCOSYLATED A1C: CPT | Performed by: INTERNAL MEDICINE

## 2024-09-06 PROCEDURE — 36415 COLL VENOUS BLD VENIPUNCTURE: CPT | Mod: PN | Performed by: INTERNAL MEDICINE

## 2024-09-06 PROCEDURE — 99999 PR PBB SHADOW E&M-EST. PATIENT-LVL IV: CPT | Mod: PBBFAC,,, | Performed by: INTERNAL MEDICINE

## 2024-09-06 PROCEDURE — 84443 ASSAY THYROID STIM HORMONE: CPT | Performed by: INTERNAL MEDICINE

## 2024-09-06 RX ORDER — OMEPRAZOLE 20 MG/1
CAPSULE, DELAYED RELEASE ORAL
Qty: 30 CAPSULE | Refills: 0 | Status: SHIPPED | OUTPATIENT
Start: 2024-09-06

## 2024-09-06 NOTE — PROGRESS NOTES
"[facility] Note    Chief Complaint    No chief complaint on file.      History of Present Illness      Mannie Naqvi is a 28 y.o. M with chronic back pain, depression, Alopecia, ADD, Inflammatory Arthritis presents to fu for well visit.  Last visit - 8/30/23    Hyperkalemia  - Potassium was slightly high. I just recommend cutting back on foods high in potassium. For a list of potassium-rich foods (refer to https://www.kidney.org/atoz/content/potassium). Some examples include nuts, bananas, bran cereals, avacados, lima beans, tomatoes, spinach. Pt never got his repeat the potassium in 2 wks.      IFG - Glucose was slightly high at 103. Ha1c - 4.9.      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       Chronic back pain/Ankylosing spondylitis - Since the age of 14-15 he has had significant back pain intermittently. He has seen multiple specialists including previous rheumatologist in [facility].  Sees chiropractor physical therapy anti-inflammatory muscle relaxants to try to help his pain. He found physical therapy to be beneficial. He did wonderful when he was prescribed a Medrol Dosepak with Dr. Andrew in the past. Robaxin was not beneficial.  He has had an MRI in the distant past he cannot recall the specifics of it he has not had any procedures or surgery. He takes naproxen 500mg po bid prn, flexeril 5 mg po qhs prn. +HLA B27 - 8/26/14 and 10/11/18.  Biking, stretching, and ice help. It radiates down to his Lateral Rt knee. He has pulled his back 4 times this yr. Most recently last weekend. Will repeat Xrays SI joints and L spine.  Fu by [facility].   MRI L spine - 9/1/23. Was referred to pain mgmnt. Pt on Meloxicam, Tizanidine. S/p Trigger point inj - 10/26/23. Xray - L spine - 8/30/23     Depression - Prev dx by psychiatrist he took an antidepressant but did not find it to be beneficial and discontinued.  "It's been pretty good".      Alopecia - Pt on finasteride 5 mg/d and Minoxodil " 2.5 mg/d     ADD - Adderall XR 20 mg QAm and 10 mg daily.  Fu by Psych.     Note -  See below for problem-based visit also performed at this time.      HCM - Flu - admin 9/6/24;  Tdap -   8/30/23;  PCV 13 - none;  PVX 23 - none;  Shingrix - due at 48 y.o.;  COVID - 19 Vaccine #1 3/31/21;  #2 4/28/21; # 3 2/4/22;  Hep C Screen - none - declines;  HIV Screen - neg. In past; C-scope - none - due at 43 y.o.;  Prev PCP - none; Rheum - Dr. Setin at [facility]; Derm - Dr. Montemayor; Ophtho - Dr. Daley; Psych - Dr. Kirill Clemens; optometry - [facility];  - Dr. Kunz; Cysto - 5/23/14 - neg. 6/1/17  - Stress test - neg; echo - 5/20/17 - EF - 60-65%    Patient Care Team:  Jelly Nettles MD as PCP - General (Internal Medicine)     Health Maintenance:  Immunization History   Administered Date(s) Administered    Tdap 08/30/2023      Health Maintenance   Topic Date Due    Hepatitis C Screening  Never done    TETANUS VACCINE  08/30/2033    Lipid Panel  Completed        Past Medical History:  Past Medical History:   Diagnosis Date    ADD (attention deficit disorder) 08/24/2022    Alopecia 08/24/2022    Chronic mid back pain 08/24/2022    COVID-19 07/18/2020    Depression 08/24/2022       Past Surgical History:   has a past surgical history that includes injection, sacroiliac joint (Right, 9/13/2023) and injection (Right, 10/12/2023).    Family History:  family history includes Arthritis in his paternal grandfather; Chronic back pain in his father and sister; Depression in his sister; Heart attack in his cousin; Heart attack (age of onset: 84) in his maternal grandmother; Hyperlipidemia in his maternal grandfather and mother; Kidney disease in his maternal grandfather.     Social History:  Social History     Tobacco Use    Smoking status: Never    Smokeless tobacco: Never   Substance Use Topics    Alcohol use: Yes     Comment: 2/night    Drug use: Never       Review of Systems   Constitutional:  Negative for chills,  fatigue, fever and night sweats.   HENT:  Negative for hearing loss.    Eyes:  Negative for visual disturbance.        Wears glasses   Respiratory:  Positive for chest tightness and shortness of breath.         When stressed off and on for yrs.    Cardiovascular:  Negative for chest pain.   Gastrointestinal:  Positive for diarrhea and nausea. Negative for abdominal pain, constipation, vomiting and reflux.        When stressed at work and multi-taksking. Diarrhea off and on the past 2 wks a little more than usual.    Endocrine: Negative for cold intolerance, heat intolerance and polydipsia.   Genitourinary:  Negative for penile swelling, scrotal swelling and testicular pain.        No hesitancy. + nocturia since childhood - unchanged. He drinks glass of wine ayt 9PM and drinks 3 cups of coffee in AM.  Had cysto in past.     Musculoskeletal:  Negative for arthralgias and myalgias.   Neurological:  Negative for dizziness and headaches.   Psychiatric/Behavioral:  The patient is nervous/anxious.         Very stressed at work.         Medications:    Current Outpatient Medications:     cyclobenzaprine (FLEXERIL) 5 MG tablet, Take 2 tablets (10 mg total) by mouth nightly as needed for Muscle spasms., Disp: 60 tablet, Rfl: 2    dextroamphetamine-amphetamine (ADDERALL XR) 20 MG 24 hr capsule, Take 1 capsule (20 mg total) by mouth every morning., Disp: 1 capsule, Rfl: 0    dextroamphetamine-amphetamine 10 mg Tab, Take 1 tablet by mouth once daily at 6am., Disp: , Rfl:     finasteride (PROSCAR) 5 mg tablet, Take 1 tablet by mouth once daily at 6am., Disp: , Rfl:     meloxicam (MOBIC) 7.5 MG tablet, Take 1 tablet (7.5 mg total) by mouth daily as needed for Pain., Disp: 60 tablet, Rfl: 2    minoxidiL (LONITEN) 2.5 MG tablet, Take 1 tablet by mouth once daily at 6am., Disp: , Rfl:     tiZANidine (ZANAFLEX) 2 MG tablet, Take 1-2 tablets (2-4 mg total) by mouth every 6 (six) hours as needed (muscular pain)., Disp: 90 tablet, Rfl:  "2    omeprazole (PRILOSEC) 20 MG capsule, 1 po daily x 2 wks, Disp: 30 capsule, Rfl: 0     Allergies:  Review of patient's allergies indicates:  No Known Allergies    Physical Exam      Vital Signs  Temp: 98.2 °F (36.8 °C)  Pulse: 72  Resp: 12  SpO2: 97 %  BP: 116/82  BP Location: Right arm  Patient Position: Sitting  Pain Score: 0-No pain  Height and Weight  Height: 5' 6" (167.6 cm)  Weight: 76.4 kg (168 lb 6.9 oz)  BSA (Calculated - sq m): 1.89 sq meters  BMI (Calculated): 27.2  Weight in (lb) to have BMI = 25: 154.6      Patient Position: Sitting      Physical Exam  Vitals reviewed. Exam conducted with a chaperone present.   Constitutional:       General: He is not in acute distress.     Appearance: Normal appearance. He is not ill-appearing, toxic-appearing or diaphoretic.   HENT:      Head: Normocephalic and atraumatic.      Right Ear: Tympanic membrane normal.   Eyes:      Extraocular Movements: Extraocular movements intact.      Conjunctiva/sclera: Conjunctivae normal.      Pupils: Pupils are equal, round, and reactive to light.   Neck:      Vascular: No carotid bruit.   Cardiovascular:      Rate and Rhythm: Normal rate and regular rhythm.      Pulses: Normal pulses.      Heart sounds: Normal heart sounds. No murmur heard.  Pulmonary:      Effort: No respiratory distress.      Breath sounds: Normal breath sounds. No wheezing.   Abdominal:      General: Bowel sounds are normal. There is no distension.      Palpations: Abdomen is soft. There is no mass.      Tenderness: There is no abdominal tenderness. There is no guarding or rebound.      Hernia: No hernia is present.      Comments: Slight TTP in RT suprapubic region. No palpable hernia lying or standing or with valsalva   Genitourinary:     Penis: Normal.       Testes: Normal.   Musculoskeletal:         General: Normal range of motion.   Skin:     General: Skin is warm.   Neurological:      General: No focal deficit present.      Mental Status: He is alert " and oriented to person, place, and time.   Psychiatric:         Mood and Affect: Mood normal.         Behavior: Behavior normal.          Laboratory:  CBC:  Recent Labs   Lab 01/24/23  0842   WBC 6.5   RBC 4.88   Hemoglobin 14.4   Hematocrit 41.5   Platelets 349   MCV 85.0   MCH 29.5   MCHC 34.8       CMP:  Recent Labs   Lab 01/24/23  0842 08/30/23  0816   Glucose 103 H 96   Calcium 9.7 9.9   ALBUMIN 4.7  --    Total Protein 7.1  --    Sodium 134 L 138   Potassium 4.5 5.2 H   CO2 26 27   Chloride 99 101   BUN 18.0 20   Creatinine 1.12 1.1   Alkaline Phosphatase 56  --    ALT 16  --    AST 16  --    Total Bilirubin 0.6  --            URINALYSIS:  Recent Labs   Lab 01/24/23  0842   Color, UA YELLOW   Specific Gravity, UA 1.019   pH, UA 7.0   Protein, UA NEGATIVE   Glucose, UA NEGATIVE   Bacteria, UA NONE SEEN   Nitrite, UA NEGATIVE   Leukocytes, UA NEGATIVE   Hyaline Casts, UA NONE SEEN        LIPIDS:  Recent Labs   Lab 01/24/23  0842   TSH 1.50   HDL 51   Cholesterol 179 H   Triglycerides 89   LDL Calculated 114 H       TSH:  Recent Labs   Lab 01/24/23  0842   TSH 1.50       A1C:  Recent Labs   Lab 08/30/23  0816   Hemoglobin A1C 4.9          Assessment/Plan     Mannie Naqvi is a 28 y.o.male with:    Normal physical exam, routine  -     CBC Auto Differential; Future; Expected date: 09/06/2024  -     Comprehensive Metabolic Panel; Future; Expected date: 09/06/2024  -     Hemoglobin A1C; Future; Expected date: 09/06/2024  -     TSH; Future; Expected date: 09/06/2024  - Performed today.  Will check Basic labs.  RTC in 1 yr for fu or sooner if needed     Screening for lipoid disorders  -     Lipid Panel; Future; Expected date: 09/06/2024       This note documents patient's acute complaint of Chronic Rt groin pain for a few mos. addressed within their primary visit for annual preventative visit.    HPI: Pt complains of Chronic Rt groin pain for a few mos.    RT groin pain - Started after injury with pickle ball in the  spring.  Just  started feeling better this wk.  Unclear if has hernia.  Radiates down his medial RT thigh. No exac or allev factors. 4-6/10 in severity.     ROS:   Negative for penile swelling, scrotal swelling and testicular pain.    Physical Exam:  Abdominal:      General: Bowel sounds are normal. There is no distension.      Palpations: Abdomen is soft. There is no mass.      Tenderness: There is no abdominal tenderness. There is no guarding or rebound.      Hernia: No hernia is present.      Comments: Slight TTP in RT suprapubic region. No palpable hernia lying or standing or with valsalva   Genitourinary:     Penis: Normal.       Testes: Normal.   A/P:   Nausea  -     omeprazole (PRILOSEC) 20 MG capsule; 1 po daily x 2 wks  Dispense: 30 capsule; Refill: 0  - Will rx omeprazole x 2 wks if needed. Alert MD if Sx's persist/worsen. Dx incl Recent AGE (wife had some GI sx;s), IBS, Gastritis, PUD.     Chronic groin pain, right  -     CT Abdomen Pelvis With IV Contrast Routine Oral Contrast; Future; Expected date: 09/06/2024  - Will check CT Abd/Pelvis to r/o hernia.     Chronic conditions status updated as per HPI.  Other than changes above, cont current medications and maintain follow up with specialists.    Follow up in about 1 year (around 9/7/2025) for well visit or sooner if needed.      Jelly Nettles MD  [facility]

## 2024-09-09 DIAGNOSIS — E87.5 HYPERKALEMIA: Primary | ICD-10-CM

## 2024-09-09 NOTE — PROGRESS NOTES
Please inform pt -   I reviewed your labs. Your Ha1c was normal at 5.0.  Your thyroid functions are normal.  Your Cholesterol looked borderline high.  Your cholesterol was high. You do not require medication for this at this time but I do recommend  a low cholesterol diet and exercise.  You can visit the American heart association website at heart.org for further info on a low cholesterol diet.  Your kidney function decreased slightly.  Make sure you are hydrating well.  Are you taking any OTC supplements like creatine? Your  liver functions looked good. Your Potassium remains slightly high but stable.  Are you taking any Potassium containing supplements/vitamins?  I just recommend cutting back on foods high in potassium. For a list of potassium-rich foods (refer to https://www.kidney.org/atoz/content/potassium). Some examples include nuts, bananas, bran cereals, avacados, lima beans, tomatoes, spinach.  We can repeat the potassium in 2 wks. No further recommendations at this time.  You are not anemic. No further recommendations at this time.  Dr. HULL

## 2024-10-11 ENCOUNTER — OFFICE VISIT (OUTPATIENT)
Dept: PAIN MEDICINE | Facility: CLINIC | Age: 29
End: 2024-10-11
Payer: COMMERCIAL

## 2024-10-11 VITALS
BODY MASS INDEX: 27.3 KG/M2 | HEIGHT: 66 IN | HEART RATE: 81 BPM | SYSTOLIC BLOOD PRESSURE: 139 MMHG | WEIGHT: 169.88 LBS | DIASTOLIC BLOOD PRESSURE: 95 MMHG

## 2024-10-11 DIAGNOSIS — M79.18 MYOFASCIAL PAIN SYNDROME: Primary | ICD-10-CM

## 2024-10-11 DIAGNOSIS — M54.50 ACUTE LEFT-SIDED LOW BACK PAIN, UNSPECIFIED WHETHER SCIATICA PRESENT: ICD-10-CM

## 2024-10-11 PROCEDURE — 99999 PR PBB SHADOW E&M-EST. PATIENT-LVL III: CPT | Mod: PBBFAC,,, | Performed by: NURSE PRACTITIONER

## 2024-10-11 RX ORDER — TRIAMCINOLONE ACETONIDE 40 MG/ML
40 INJECTION, SUSPENSION INTRA-ARTICULAR; INTRAMUSCULAR
Status: DISCONTINUED | OUTPATIENT
Start: 2024-10-11 | End: 2024-10-11 | Stop reason: HOSPADM

## 2024-10-11 RX ORDER — TRIAMCINOLONE ACETONIDE 40 MG/ML
40 INJECTION, SUSPENSION INTRA-ARTICULAR; INTRAMUSCULAR
Status: SHIPPED | OUTPATIENT
Start: 2024-10-11

## 2024-10-11 RX ADMIN — TRIAMCINOLONE ACETONIDE 40 MG: 40 INJECTION, SUSPENSION INTRA-ARTICULAR; INTRAMUSCULAR at 03:10

## 2024-10-11 NOTE — PROCEDURES
Trigger Point Injection    Performed by: Yuri Gleason FNP  Authorized by: Yuri Gleason FNP    Lumbar Paraspinal:  Left    Consent Done?:  Yes (Written)    Pre-Procedure:   Indications:  Pain relief and pain  Site marked: the procedure site was marked     Timeout: prior to procedure the correct patient, procedure, and site was verified      Local anesthesia used?: Yes    Local anesthetic:  Bupivacaine 0.25% without epinephrine  Anesthetic total (ml):  5    Medications: 40 mg triamcinolone acetonide 40 mg/mL  Lumbosacral:  Left

## 2024-10-11 NOTE — PROGRESS NOTES
Chronic Pain - establish pain management clinic visit    Referring Physician: Yuri Gleason FNP    Chief Complaint:   Chief Complaint   Patient presents with    Follow-up    Low-back Pain     Left Sciatica        Interval Update 10/11/2024: Patient return to clinic to discuss possible left low back pain.  Onset last few days pain begins in the left low back near and around the left SI also appears patient is experiencing left piriformis syndrome pain.  He is established office and has been previously provided trigger point injections which have helped with pain symptoms.  He does report radiating symptoms intermittently down the left leg stopping just below his knee.  He is requesting repeat trigger point injections in the left low back and an area to help reduce some of his acute/subacute symptoms.    Interval Update 06/11/2024: Patient present here in clinic today for Right  low back pain,  onset 2-3 days pain is located in the right lower lumbar spine specifically in 1 spot  patient denies any recent incident or trauma patient is an active person and works out 3-4 times per week he denies any radicular symptoms denies any paresthesia like symptoms.  He is also complaining of pain in the right groin he reports that he was playing pickle ball and felt a pull in his groin right only.  Denies any radiating symptoms denies any profound weakness.  He is established office and was previously provided a trigger point injection in the right low back which helped.  He denies profound weakness denies bowel or bladder dysfunction denies any saddle anesthesia at this time.    SUBJECTIVE:    Mannie Naqvi presents to the clinic for the evaluation of lower back pain. He states the current episode has been for 3 weeks. He stated when he was 14, he had pain in the middle of the night with his back burning.  He had seen a rheumatologist for this in the past.  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 He also states he has had iritis in January - March 2023. In his history, he has had pain going down his legs and occasional pain just in the back. He has had several bouts of physical therapy, which have helped with his lower back. The current pain started 3 weeks ago following no inciting event and symptoms have been worsening.The pain is located in the lower back area and radiates to the right leg and stops at the knee.  The pain is described as sharp and is rated as 7/10. The pain is rated with a score of  3/10 on the BEST day and a score of 8/10 on the WORST day.  Symptoms interfere with daily activity, sleeping, and work. The pain is exacerbated by walking, turning, getting out of bed.  The pain is mitigated by ice and stretching, and not moving. The patient reports 7 hours of uninterrupted sleep per night.    Patient denies urinary incontinence, bowel incontinence, and significant motor weakness.    Physical Therapy/Home Exercise: yes, completed physical therapy in 2022.  Still doing home exercises at home.      Pain Disability Index Review:      6/11/2024    10:02 AM 10/26/2023     2:30 PM 9/27/2023     2:45 PM   Last 3 PDI Scores   Pain Disability Index (PDI) 35 24 41     Interval updates 09/27/2023  27-year-old male that presents today for follow-up appointment he is status post a right SI joint injection he reports some relief lasting a couple days and then pain returned.  Reports continued pain when exercising in the right lower lumbar spine near the posterior superior iliac spine.  He continues to report pain that radiates down his right leg stopping into his calf area.  Primary source of pain is right low back but does experience intermittent pain in the right leg.  He reports not taking the meloxicam 7.5 that was prescribed to him by Dr. Osorio.      Pain Medications:   - cyclobenzaprine   - naproxen     report:  Reviewed and consistent with medication use as prescribed.    Pain  "Procedures:   -  10/26/2023 TPIs Dr. Devon Johns  -10/12/2023 Right Piriformis Muscle Injection  - 09/13/2023 Right Sacroiliac Joint Injection    Imaging:   MRI LUMBAR SPINE WITHOUT CONTRAST     CLINICAL HISTORY:  Lumbar radiculopathy, symptoms persist with conservative treatment; Radiculopathy, lumbar region     TECHNIQUE:  Multiplanar, multisequence MR images were acquired from the thoracolumbar junction to the sacrum     FINDINGS:  Alignment: Normal.     Vertebrae: 5 lumbar-type vertebral bodies. No aggressive marrow replacement process or fracture.Nobone marrow edema .     Discs: Satisfactory height and signal.     Cord: Normal. Conus terminates at .     Degenerative findings:     T12-L1: There is no focal disc herniation. No significant central canal narrowing . No significant neural foraminal narrowing.     L1-L2: There is no focal disc herniation. No significant central canal narrowing . No significant neural foraminal narrowing.     L2-L3: There is no focal disc herniation. No significant central canal narrowing . No significant neural foraminal narrowing.     L3-L4: There is no focal disc herniation. No significant central canal narrowing . No significant neural foraminal narrowing.     L4-L5: There is no focal disc herniation. No significant central canal narrowing . No significant neural foraminal narrowing.     L5-S1: There is no focal disc herniation. No significant central canal narrowing . No significant neural foraminal narrowing.     Paraspinal muscles & soft tissues: Unremarkable.     There is no definite increased signal intensity of the rim of the endplates of multiple thoracic vertebral bodies on STIR images, to suggest bone marrow edema or osteitis./MR "plan of "shiny Corner sign"     There is incompletely visualized asymmetric sacroiliitis RIGHT greater than LEFT.  Bone marrow edema/osteitis is identified on multiple slices with subchondral sclerosis, early.  Periarticular fat deposition is " better seen on the LEFT.  There is suspicion for early bony bridging/ankylosis bilaterally.  Dedicated sacroiliac joint imaging recommended.     Impression:     No focal protrusion or extrusion of disc material.  No evidence for central or foraminal stenosis.     Asymmetric sacroiliitis, incompletely visualized RIGHT greater than LEFT, with findings suspicious foraxial/ankylosing spondylitis.  Dedicated MRI of the sacroiliac joints recommended.        Electronically signed by: Tulio Edward MD  Date:                                            09/02/2023  Time:                                           08:34      XR SACROILIAC JOINTS 3 VIEWS     CLINICAL HISTORY:  Low back pain, unspecified     TECHNIQUE:  SI joints three views     COMPARISON:  08/26/2014 none     FINDINGS:  The SI joints are about maintained.  No fracture or dislocation.  No bone destruction identified     Impression:     See above        Electronically signed by: Devon Trujillo MD  Date:                                            08/30/2023  Time:                                           08:55      XR LUMBAR SPINE COMPLETE 5 VIEW     CLINICAL HISTORY:  Low back pain, unspecified     TECHNIQUE:  AP, lateral, spot and bilateral oblique views of the lumbar spine were performed.     COMPARISON:  08/26/2014     FINDINGS:  Vertebral bodies are intact.  Disc spaces are maintained.  Bony structures are intact.  No collapse or destruction is noted.     Impression:     See above        Electronically signed by: Richie Jean MD  Date:                                            08/30/2023  Time:                                           08:55    Past Medical History:   Diagnosis Date    ADD (attention deficit disorder) 08/24/2022    Alopecia 08/24/2022    Chronic mid back pain 08/24/2022    COVID-19 07/18/2020    Depression 08/24/2022     Past Surgical History:   Procedure Laterality Date    INJECTION Right 10/12/2023    Procedure: INJECTION, RIGHT  PIRIFORMIS AND TRIGGER POINT;  Surgeon: Ulices Osorio MD;  Location: [facility];  Service: Pain Management;  Laterality: Right;    INJECTION, SACROILIAC JOINT Right 9/13/2023    Procedure: RT SI joint injection;  Surgeon: Chao Nam MD;  Location: [facility];  Service: Pain Management;  Laterality: Right;  15 mins     Social History     Socioeconomic History    Marital status: Single   Tobacco Use    Smoking status: Never    Smokeless tobacco: Never   Substance and Sexual Activity    Alcohol use: Yes     Comment: 2/night    Drug use: Never    Sexual activity: Yes     Partners: Female     Family History   Problem Relation Name Age of Onset    Hyperlipidemia Mother      Chronic back pain Father      Depression Sister      Chronic back pain Sister      Heart attack Maternal Grandmother  84    Hyperlipidemia Maternal Grandfather      Kidney disease Maternal Grandfather      Arthritis Paternal Grandfather      Heart attack Cousin         Review of patient's allergies indicates:  No Known Allergies    Current Outpatient Medications   Medication Sig    cyclobenzaprine (FLEXERIL) 5 MG tablet Take 2 tablets (10 mg total) by mouth nightly as needed for Muscle spasms.    dextroamphetamine-amphetamine (ADDERALL XR) 20 MG 24 hr capsule Take 1 capsule (20 mg total) by mouth every morning.    dextroamphetamine-amphetamine 10 mg Tab Take 1 tablet by mouth once daily at 6am.    finasteride (PROSCAR) 5 mg tablet Take 1 tablet by mouth once daily at 6am.    meloxicam (MOBIC) 7.5 MG tablet Take 1 tablet (7.5 mg total) by mouth daily as needed for Pain.    minoxidiL (LONITEN) 2.5 MG tablet Take 1 tablet by mouth once daily at 6am.    omeprazole (PRILOSEC) 20 MG capsule 1 po daily x 2 wks    tiZANidine (ZANAFLEX) 2 MG tablet Take 1-2 tablets (2-4 mg total) by mouth every 6 (six) hours as needed (muscular pain).     No current facility-administered medications for this visit.       REVIEW OF SYSTEMS:    GENERAL:  No  "weight loss, malaise or fevers.  HEENT:  Negative for frequent or significant headaches.  NECK:  Negative for lumps, goiter, pain and significant neck swelling.  RESPIRATORY:  Negative for cough, wheezing or shortness of breath.  CARDIOVASCULAR:  Negative for chest pain, leg swelling or palpitations.  GI:  Negative for abdominal discomfort, blood in stools or black stools or change in bowel habits.  MUSCULOSKELETAL:  See HPI.  SKIN:  Negative for lesions, rash, and itching.  PSYCH:  Negative for sleep disturbance, mood disorder and recent psychosocial stressors.  HEMATOLOGY/LYMPHOLOGY:  Negative for prolonged bleeding, bruising easily or swollen nodes.  NEURO:   No history of headaches, syncope, paralysis, seizures or tremors.  All other reviewed and negative other than HPI.    OBJECTIVE:    BP (!) 139/95   Pulse 81   Ht 5' 6" (1.676 m)   Wt 77 kg (169 lb 13.8 oz)   BMI 27.42 kg/m²     PHYSICAL EXAMINATION:  General appearance: Well appearing, in no acute distress, alert and appropriately communicative.  Psych:  Mood and affect appropriate.  Skin: Skin color, texture, turgor normal, no rashes or lesions, in both upper and lower body.  Head/face:  Atraumatic, normocephalic.  Cor: regular rate  Pulm: non-labored breathing  GI: Abdomen non-distended and non-tender.  Back: Straight leg raising in the sitting and supine positions is negative to radicular pain. No pain to palpation over the spine or paraspinal muscles. Slight pain on extension/facet loading.  Extremities: Peripheral joint ROM is full and pain free without obvious instability or laxity in all four extremities. No deformities, edema, or skin discoloration. Good capillary refill.  Musculoskeletal:  hip, sacroiliac and knee provocative maneuvers are negative with the exception of positive DANNY bilaterally, + Yeoman's on the right, + Gaenslen on the right, + Tiffany finger test on the right. Bilateral upper and lower extremity strength is normal and " symmetric.  No atrophy or tone abnormalities are noted.  Neuro: Bilateral upper and lower extremity coordination and muscle stretch reflexes are physiologic and symmetric.  Negative Clonus. No loss of sensation is noted.  Gait: Normal.      ASSESSMENT: 28 y.o. year old male with lower back pain, consistent with:     No diagnosis found.      IMPRESSION: Mr. Naqvi presents for pleasant 27 y.o gentleman who presents for lower back pain.  He has had a prior diagnosis of ankylosing spondylitis and has had lower back pain since he was 14 years old.  He has done physical therapy, home exercises, and over the counter pain medications with good relief, until the past 3 weeks.  He feels like he is not able to overcome his current exacerbation with physical therapy/medications alone. He is interested in interventions which could help him.  History, physical exam, and imaging are consistent with sacroiliitis right > left. Since he has done conservative measures, he would be a candidate for interventions at this point.     09/27/2023 that 27-year-old male status post a right SI joint injection with minimal relief.  Previous diagnosis of ankylosing spondylosis he has had lower back pain since he was 14 years old.  Upon history and exam I did feel tenderness and muscular knots in his right low back.  I do believe that his pain is coming from the SI joint but I also believe he has a myofascial component to his low back pain.  Recommended trigger point injections in the right lower lumbar sacral area to help with acute pain.  Verbalized understanding and agreed.     06/11/2024-Mannie Naqvi is a 28 y.o. male who  has a past medical history of ADD (attention deficit disorder) (08/24/2022), Alopecia (08/24/2022), Chronic mid back pain (08/24/2022), COVID-19 (07/18/2020), and Depression (08/24/2022).  By history and examination this patient has chronic low back pain with radiculopathy.  The underlying cause cause is muscle  dysfunction, muscles strain, and sacroiliitis.  Pathology is confirmed by imaging.  We discussed the underlying diagnoses and multiple treatment options including non-opioid medications, interventional procedures, home exercise, core muscle enhancement, activity modification, and weight loss.  The risks and benefits of each treatment option were discussed and all questions were answered.      10/11/2024-Mannie Naqvi is a 28 y.o. male who  has a past medical history of ADD (attention deficit disorder) (08/24/2022), Alopecia (08/24/2022), Chronic mid back pain (08/24/2022), COVID-19 (07/18/2020), and Depression (08/24/2022).  By history and examination this patient has chronic left and right low back pain with appears to be left radiculopathy.  I do believe the majority of his pain could be coming from his left piriformis he also has some left SI joint involvement.  The underlying cause cause is muscle dysfunction, muscles strain, and sacroiliitis.  Pathology is confirmed by imaging.  We discussed the underlying diagnoses and multiple treatment options including home exercise, core muscle enhancement, and activity modification.  The risks and benefits of each treatment option were discussed and all questions were answered.        PLAN:   - I have stressed the importance of physical activity and a home exercise plan to help with pain and improve health.  - Patient can continue with medications for now since they are providing benefits, using them appropriately, and without side effects.  - schedule for right trigger point injections in the left lumbar sacral area  -consider left piriformis injection in the future if trigger points are unsuccessful.  -  continue medications as previously prescribed  - RTC  3-4 months or sooner if needed  - Counseled patient regarding the importance of activity modification and physical therapy.    The above plan and management options were discussed at length with patient. Patient is  in agreement with the above and verbalized understanding. It will be communicated with the referring physician via electronic record, fax, or mail.        RAJINDER Stephens  Interventional Pain Management    09/27/2023

## 2024-10-14 DIAGNOSIS — R11.0 NAUSEA: ICD-10-CM

## 2024-10-14 RX ORDER — OMEPRAZOLE 20 MG/1
CAPSULE, DELAYED RELEASE ORAL
Qty: 30 CAPSULE | Refills: 0 | OUTPATIENT
Start: 2024-10-14

## 2024-10-14 NOTE — TELEPHONE ENCOUNTER
Please seemy prev unanswered question from a refill request from 10/10/24 -    Does pt need a refill on omeprazole? We gave a 2-4 wk trial. Also, I noticed he never got the CT scan.  Is he still planning to do so or did Sx's resolve?  Dr. HULL

## 2024-10-14 NOTE — TELEPHONE ENCOUNTER
No care due was identified.  [facility] Embedded Care Due Messages. Reference number: [id number].   10/14/2024 11:26:12 AM CDT

## 2024-10-14 NOTE — TELEPHONE ENCOUNTER
My chart and voice message left for patient  Does pt need a refill on omeprazole? We gave a 2-4 wk trial. Also, I noticed he never got the CT scan. Is he still planning to do so or did Sx's resolve?

## 2025-03-13 ENCOUNTER — TELEPHONE (OUTPATIENT)
Dept: PAIN MEDICINE | Facility: CLINIC | Age: 30
End: 2025-03-13
Payer: COMMERCIAL

## 2025-03-13 ENCOUNTER — PATIENT MESSAGE (OUTPATIENT)
Dept: PAIN MEDICINE | Facility: CLINIC | Age: 30
End: 2025-03-13
Payer: COMMERCIAL

## (undated) DEVICE — DRESSING LEUKOPLAST FLEX 1X3IN